# Patient Record
Sex: FEMALE | Race: WHITE | HISPANIC OR LATINO | Employment: PART TIME | URBAN - METROPOLITAN AREA
[De-identification: names, ages, dates, MRNs, and addresses within clinical notes are randomized per-mention and may not be internally consistent; named-entity substitution may affect disease eponyms.]

---

## 2017-08-21 ENCOUNTER — OFFICE VISIT (OUTPATIENT)
Dept: MEDICAL GROUP | Facility: CLINIC | Age: 37
End: 2017-08-21
Payer: COMMERCIAL

## 2017-08-21 VITALS
SYSTOLIC BLOOD PRESSURE: 108 MMHG | OXYGEN SATURATION: 99 % | TEMPERATURE: 97.3 F | RESPIRATION RATE: 14 BRPM | DIASTOLIC BLOOD PRESSURE: 64 MMHG | HEART RATE: 64 BPM | BODY MASS INDEX: 26.87 KG/M2 | WEIGHT: 146 LBS | HEIGHT: 62 IN

## 2017-08-21 DIAGNOSIS — Z00.00 ROUTINE GENERAL MEDICAL EXAMINATION AT A HEALTH CARE FACILITY: ICD-10-CM

## 2017-08-21 DIAGNOSIS — Z00.00 LABORATORY EXAMINATION ORDERED AS PART OF A ROUTINE GENERAL MEDICAL EXAMINATION: ICD-10-CM

## 2017-08-21 DIAGNOSIS — Z23 NEED FOR TDAP VACCINATION: ICD-10-CM

## 2017-08-21 PROCEDURE — 90471 IMMUNIZATION ADMIN: CPT | Performed by: FAMILY MEDICINE

## 2017-08-21 PROCEDURE — 90715 TDAP VACCINE 7 YRS/> IM: CPT | Performed by: FAMILY MEDICINE

## 2017-08-21 PROCEDURE — 99395 PREV VISIT EST AGE 18-39: CPT | Mod: 25 | Performed by: FAMILY MEDICINE

## 2017-08-21 ASSESSMENT — ENCOUNTER SYMPTOMS
SEIZURES: 0
ORTHOPNEA: 0
SENSORY CHANGE: 0
NERVOUS/ANXIOUS: 0
TINGLING: 0
HEARTBURN: 0
MYALGIAS: 0
NECK PAIN: 0
SHORTNESS OF BREATH: 0
DIARRHEA: 0
ABDOMINAL PAIN: 0
WEIGHT LOSS: 0
BLURRED VISION: 0
CHILLS: 0
PALPITATIONS: 0
VOMITING: 0
NAUSEA: 0
LOSS OF CONSCIOUSNESS: 0
FOCAL WEAKNESS: 0
BACK PAIN: 0
DOUBLE VISION: 0
COUGH: 0
HEADACHES: 1
DEPRESSION: 0
DIZZINESS: 0
BRUISES/BLEEDS EASILY: 0
FEVER: 0
SORE THROAT: 0
SPUTUM PRODUCTION: 0
BLOOD IN STOOL: 0
TREMORS: 0
SPEECH CHANGE: 0

## 2017-08-21 ASSESSMENT — PATIENT HEALTH QUESTIONNAIRE - PHQ9: CLINICAL INTERPRETATION OF PHQ2 SCORE: 0

## 2017-08-21 ASSESSMENT — LIFESTYLE VARIABLES: SUBSTANCE_ABUSE: 0

## 2017-08-21 NOTE — MR AVS SNAPSHOT
"        Estela Bryantr   2017 3:20 PM   Office Visit   MRN: [id number]    Department:  [facility]   Dept Phone:  [phone]    Description:  Female : 1980   Provider:  Susan Canas M.D.           Reason for Visit     Annual Exam           Allergies as of 2017     Allergen Noted Reactions    Zomig [Zolmitriptan] 2012   Shortness of Breath      You were diagnosed with     Routine general medical examination at a health care facility   [V70.0.ICD-9-CM]       Laboratory examination ordered as part of a routine general medical examination   [V72.62.ICD-9-CM]       Need for Tdap vaccination   [[id number]]         Vital Signs     Blood Pressure Pulse Temperature Respirations Height Weight    108/64 mmHg 64 36.3 °C (97.3 °F) 14 1.575 m (5' 2\") 66.225 kg (146 lb)    Body Mass Index Oxygen Saturation Last Menstrual Period Smoking Status          26.70 kg/m2 99% 2010 Never Smoker         Basic Information     Date Of Birth Sex Race Ethnicity Preferred Language    1980 Female  or   Origin (Hungarian,Slovenian,Palauan,Parth, etc) English      Problem List              ICD-10-CM Priority Class Noted - Resolved    CVA (cerebrovascular accident due to intracerebral hemorrhage) (CMS-HCC) I61.9   2009 - Present    Chronic post-traumatic headache G44.329   Unknown - Present    Large cavernous hemangioma D18.09   Unknown - Present      Health Maintenance        Date Due Completion Dates    IMM DTaP/Tdap/Td Vaccine (1 - Tdap) 1998 ---    IMM INFLUENZA (1) 2017 10/25/2009            Current Immunizations     Influenza TIV (IM) 10/25/2009  2:45 PM    Tdap Vaccine  Incomplete      Below and/or attached are the medications your provider expects you to take. Review all of your home medications and newly ordered medications with your provider and/or pharmacist. Follow medication instructions as directed by your provider and/or pharmacist. Please keep your " medication list with you and share with your provider. Update the information when medications are discontinued, doses are changed, or new medications (including over-the-counter products) are added; and carry medication information at all times in the event of emergency situations     Allergies:  ZOMIG - Shortness of Breath               Medications  Valid as of: August 21, 2017 -  4:10 PM    Generic Name Brand Name Tablet Size Instructions for use    .                 Medicines prescribed today were sent to:     [email or web address] [address] [place] [address] [place], [place] - [address] [facility] [employer]    [address] [place] [address]    Phone: [phone] Fax: [phone]    Open 24 Hours?: No      Medication refill instructions:       If your prescription bottle indicates you have medication refills left, it is not necessary to call your provider’s office. Please contact your pharmacy and they will refill your medication.    If your prescription bottle indicates you do not have any refills left, you may request refills at any time through one of the following ways: The online [employer] system (except Urgent Care), by calling your provider’s office, or by asking your pharmacy to contact your provider’s office with a refill request. Medication refills are processed only during regular business hours and may not be available until the next business day. Your provider may request additional information or to have a follow-up visit with you prior to refilling your medication.   *Please Note: Medication refills are assigned a new Rx number when refilled electronically. Your pharmacy may indicate that no refills were authorized even though a new prescription for the same medication is available at the pharmacy. Please request the medicine by name with the pharmacy before contacting your provider for a refill.        Your To Do List     Future Labs/Procedures Complete By Expires    CBC WITHOUT DIFFERENTIAL   As directed 2/21/2018    COMP METABOLIC PANEL  As directed 8/22/2018    LIPID PROFILE  As directed 8/22/2018         MyChart Status: Patient Declined

## 2017-08-21 NOTE — PROGRESS NOTES
"Subjective:      Estela Pena is a 37 y.o. female who presents with Gynecologic Exam            Gynecologic Exam        ROS       Objective:     /64 mmHg  Pulse 64  Temp(Src) 36.3 °C (97.3 °F)  Resp 14  Ht 1.575 m (5' 2\")  Wt 66.225 kg (146 lb)  BMI 26.70 kg/m2  SpO2 99%  LMP 07/07/2010     Physical Exam            Assessment/Plan:     1. Routine general medical examination at a health care facility  ***    2. Laboratory examination ordered as part of a routine general medical examination  ***  - COMP METABOLIC PANEL; Future  - LIPID PROFILE; Future  - CBC WITHOUT DIFFERENTIAL; Future    3. Need for Tdap vaccination  ***  - TDAP VACCINE =>8YO IM        "

## 2017-12-15 ENCOUNTER — OFFICE VISIT (OUTPATIENT)
Dept: URGENT CARE | Facility: PHYSICIAN GROUP | Age: 37
End: 2017-12-15
Payer: COMMERCIAL

## 2017-12-15 ENCOUNTER — HOSPITAL ENCOUNTER (OUTPATIENT)
Dept: RADIOLOGY | Facility: MEDICAL CENTER | Age: 37
End: 2017-12-15
Attending: EMERGENCY MEDICINE
Payer: COMMERCIAL

## 2017-12-15 VITALS
BODY MASS INDEX: 28.16 KG/M2 | RESPIRATION RATE: 12 BRPM | SYSTOLIC BLOOD PRESSURE: 110 MMHG | TEMPERATURE: 97.4 F | DIASTOLIC BLOOD PRESSURE: 72 MMHG | HEIGHT: 62 IN | OXYGEN SATURATION: 98 % | HEART RATE: 60 BPM | WEIGHT: 153 LBS

## 2017-12-15 DIAGNOSIS — J04.0 LARYNGITIS: ICD-10-CM

## 2017-12-15 LAB
INT CON NEG: NEGATIVE
INT CON POS: POSITIVE
S PYO AG THROAT QL: NEGATIVE

## 2017-12-15 PROCEDURE — 70360 X-RAY EXAM OF NECK: CPT

## 2017-12-15 PROCEDURE — 87880 STREP A ASSAY W/OPTIC: CPT | Performed by: EMERGENCY MEDICINE

## 2017-12-15 PROCEDURE — 99203 OFFICE O/P NEW LOW 30 MIN: CPT | Performed by: EMERGENCY MEDICINE

## 2017-12-15 RX ORDER — METHYLPREDNISOLONE 4 MG/1
TABLET ORAL
Qty: 1 KIT | Refills: 0 | Status: SHIPPED | OUTPATIENT
Start: 2017-12-15 | End: 2020-05-20

## 2017-12-15 RX ORDER — CODEINE PHOSPHATE AND GUAIFENESIN 10; 100 MG/5ML; MG/5ML
5 SOLUTION ORAL EVERY 6 HOURS PRN
Qty: 200 ML | Refills: 0 | Status: SHIPPED | OUTPATIENT
Start: 2017-12-15 | End: 2017-12-25

## 2017-12-15 NOTE — PROGRESS NOTES
"Subjective:      Estela Pena is a 37 y.o. female who presents with Laryngitis (with sore thorat x 2 days )            HPI  Pt with laryngitis/ sore throat  for the past three days with almost total loss of there voice.  Pt has no stridor, did not get a flu shot. Pt denies fever , chills, nausea or vomiting,    Allergies   Allergen Reactions   • Zomig [Zolmitriptan] Shortness of Breath     Social History     Social History   • Marital status:      Spouse name: N/A   • Number of children: N/A   • Years of education: N/A     Occupational History   • Not on file.     Social History Main Topics   • Smoking status: Never Smoker   • Smokeless tobacco: Never Used   • Alcohol use No   • Drug use: No   • Sexual activity: Yes     Birth control/ protection: Surgical     Other Topics Concern   • Not on file     Social History Narrative   • No narrative on file     No current outpatient prescriptions on file prior to visit.     No current facility-administered medications on file prior to visit.    . family history includes Heart Disease in her mother; Hypertension in her mother; Other in her father.  Review of Systems   Constitutional: Negative for chills and fever.   HENT: Positive for congestion and sore throat.    Eyes: Negative for discharge and redness.   Respiratory: Positive for cough. Negative for sputum production and shortness of breath.    Cardiovascular: Negative for chest pain and palpitations.   Gastrointestinal: Negative for abdominal pain, nausea and vomiting.   Genitourinary: Negative.    Musculoskeletal: Negative for back pain and neck pain.   Skin: Negative for rash.   Neurological: Positive for speech change. Negative for tingling.          Objective:     /72   Pulse 60   Temp 36.3 °C (97.4 °F)   Resp 12   Ht 1.575 m (5' 2\")   Wt 69.4 kg (153 lb)   LMP 07/07/2010   SpO2 98%   BMI 27.98 kg/m²      Physical Exam   Constitutional: She appears well-developed and well-nourished. No " distress.   HENT:   Head: Normocephalic.   Right Ear: External ear normal.   Left Ear: External ear normal.   Mouth/Throat: No oropharyngeal exudate.   Severe laryngitis , pharynx inflamed.   Eyes: Conjunctivae are normal. Right eye exhibits no discharge. Left eye exhibits no discharge.   Neck: No tracheal deviation present.   Cardiovascular: Normal rate, regular rhythm and normal heart sounds.    Pulmonary/Chest: Effort normal and breath sounds normal. No stridor. No respiratory distress. She has no wheezes.   Abdominal: She exhibits no distension. There is no tenderness.   Musculoskeletal: Normal range of motion. She exhibits no edema, tenderness or deformity.   Lymphadenopathy:     She has no cervical adenopathy.   Neurological: She is alert. Coordination normal.   Skin: Skin is warm and dry. She is not diaphoretic. No erythema.   Psychiatric: She has a normal mood and affect. Her behavior is normal.              70    Soft tissue neck no retropharyngeal or epiglottal swelling.doris.    poct strep negative.  Assessment/Plan:     DX Acute Laryngitis    I will get a soft tissue of the neck to r/o retropharyngeal abscess.  NEGATIVE    I am recommending the patient initiate/ continue hydration efforts including the use of a vaporizer/humidifier/ netti pot. I also recommend the pt, initiate Mucinex (if older than 4) Sudafed or Dayquil if not hypertensive. In addition the patient will initiate the prescribed prescription medication/s: medrol dose mahendra. Cheratussin for cough. If the patient's condition exacerbates with worsening dysphagia, shortness of breath, uncontrolled fever, headache or chest pressure he/she will return immediately to the urgent care or go to  the emergency department for further evaluation. Pt given a work note for two days off.    Rebel Ruiz

## 2017-12-15 NOTE — LETTER
Diagnostic Sleep Study: Sleep Study Interpretation       Patient: female          Age: 37 y.o.                Sleep Study Data      Clinical Comments:      ***    INTERPRETATION:     ***    RESPIRATORY:     ***    OXIMETRY:     ***    CARDIAC:    ***    LIMB MOVEMENTS:     ***      Sleep Study Interpretation    ***

## 2017-12-15 NOTE — LETTER
December 15, 2017        Estela Pena  [address] [facility] [address]        Dear Estela:    Please ask for the next two days off from work for medical reasons.    If you have any questions or concerns, please don't hesitate to call.        Sincerely,        Rebel Ruiz M.D.    Electronically Signed

## 2017-12-16 ASSESSMENT — ENCOUNTER SYMPTOMS
SORE THROAT: 1
VOMITING: 0
SHORTNESS OF BREATH: 0
SPEECH CHANGE: 1
COUGH: 1
BACK PAIN: 0
NAUSEA: 0
FEVER: 0
EYE DISCHARGE: 0
TINGLING: 0
SPUTUM PRODUCTION: 0
PALPITATIONS: 0
ABDOMINAL PAIN: 0
CHILLS: 0
EYE REDNESS: 0
NECK PAIN: 0

## 2020-05-20 ENCOUNTER — TELEPHONE (OUTPATIENT)
Dept: MEDICAL GROUP | Facility: MEDICAL CENTER | Age: 40
End: 2020-05-20

## 2020-05-20 ENCOUNTER — TELEMEDICINE (OUTPATIENT)
Dept: MEDICAL GROUP | Facility: MEDICAL CENTER | Age: 40
End: 2020-05-20
Payer: COMMERCIAL

## 2020-05-20 VITALS — HEIGHT: 63 IN | WEIGHT: 150 LBS | BODY MASS INDEX: 26.58 KG/M2

## 2020-05-20 DIAGNOSIS — G47.00 MIXED INSOMNIA: ICD-10-CM

## 2020-05-20 DIAGNOSIS — J02.8 ACUTE BACTERIAL PHARYNGITIS: ICD-10-CM

## 2020-05-20 DIAGNOSIS — B96.89 ACUTE BACTERIAL PHARYNGITIS: ICD-10-CM

## 2020-05-20 DIAGNOSIS — G44.329 CHRONIC POST-TRAUMATIC HEADACHE, NOT INTRACTABLE: ICD-10-CM

## 2020-05-20 PROCEDURE — 99213 OFFICE O/P EST LOW 20 MIN: CPT | Mod: 95,CR | Performed by: FAMILY MEDICINE

## 2020-05-20 RX ORDER — HYDROCODONE BITARTRATE AND ACETAMINOPHEN 7.5; 325 MG/1; MG/1
1 TABLET ORAL EVERY 4 HOURS PRN
Qty: 40 TAB | Refills: 0 | Status: SHIPPED | OUTPATIENT
Start: 2020-05-20 | End: 2020-11-05 | Stop reason: SDUPTHER

## 2020-05-20 RX ORDER — AMOXICILLIN 875 MG/1
875 TABLET, COATED ORAL 2 TIMES DAILY
Qty: 14 TAB | Refills: 0 | Status: SHIPPED | OUTPATIENT
Start: 2020-05-20 | End: 2020-05-27

## 2020-05-20 NOTE — PROGRESS NOTES
This encounter was conducted via Zoom meeting  Verbal consent was obtained. Patient's identity was verified.       CC:Sore throat, headache, insomnia                                                                                                                                      HPI:   Estela presents today with the following.    1. Chronic post-traumatic headache, not intractable  Patient continues to have episodic headache.  This responds well to hydrocodone taken as needed.  Her last quantity of hydrocodone lasted over 2 years.   review shows no controlled substances prescribed in the last 2 years.  Patient has been careful with the medication.  Discussed with the patient that I cannot prescribe to her in the quantity that I did in the past.  She states she is fine with that.  Opiate risk score is 0.  Discussed risks and benefits of the medication.  Patient is very familiar with the medication having taken it episodically for years.  Patient does not drink any alcohol.  No aberrant use or irresponsible behavior has been observed or reported.  I feel the medication is appropriate for this patient.    2. Acute bacterial pharyngitis  Patient has had 3 days of very sore throat accompanied by phlegm that is yellow and green.  Denies any blood.  Denies any shortness of breath.  She is having some intermittent productive cough.  Denies fever or chills.  Denies GI changes.  Patient has had pharyngitis episodically over the years, usually streptococcal.  Amoxicillin is prescribed.  She will let me know if this does not work well for her.  If her condition worsens she will call the [facility] department right away.    3. Mixed insomnia  Patient has had insomnia she states for greater than 6 months.  She has trouble falling asleep and staying asleep.  She has tried NyQuil.  She has tried herbal teas for sleep.  Discussed trying low-dose Tylenol PM.  She will let me know how that works for her.      Patient  "Active Problem List    Diagnosis Date Noted   • Large cavernous hemangioma    • Chronic post-traumatic headache    • CVA (cerebrovascular accident due to intracerebral hemorrhage) ([facility]) 07/17/2009       Current Outpatient Medications   Medication Sig Dispense Refill   • HYDROcodone-acetaminophen (NORCO) 7.5-325 MG per tablet Take 1 Tab by mouth every four hours as needed for up to 7 days. 40 Tab 0   • amoxicillin (AMOXIL) 875 MG tablet Take 1 Tab by mouth 2 times a day for 7 days. 14 Tab 0     No current facility-administered medications for this visit.          Allergies as of 05/20/2020 - Reviewed 05/20/2020   Allergen Reaction Noted   • Zomig [zolmitriptan] Shortness of Breath 07/06/2012        ROS:  Denies, chest pain, Shortness of breath, Edema.     Ht 1.6 m (5' 3\")   Wt 68 kg (150 lb)   LMP 07/07/2010   BMI 26.57 kg/m²       Physical Exam:  Constitutional: Alert, no distress, well-groomed.  Skin: No rashes in visible areas.  Eye: Round. Conjunctiva clear, No icterus.   ENMT: Lips pink without lesions, good dentition, moist mucous membranes. Phonation normal.  It is difficult to see on the video but I do believe she has erythema of the back of the throat.  Neck: No masses, no thyromegaly. Moves freely without pain.  CV: Pulse as reported by patient  Respiratory: Unlabored respiratory effort, no cough or audible wheeze  Psych: Alert and oriented x3, normal affect and mood.          Assessment and Plan.   40 y.o. female with the following issues.    1. Chronic post-traumatic headache, not intractable  The medication has been helpful in the past and well-tolerated and is renewed.  - HYDROcodone-acetaminophen (NORCO) 7.5-325 MG per tablet; Take 1 Tab by mouth every four hours as needed for up to 7 days.  Dispense: 40 Tab; Refill: 0    2. Acute bacterial pharyngitis  Antibiotic prescription discussed and sent to pharmacy.  - amoxicillin (AMOXIL) 875 MG tablet; Take 1 Tab by mouth 2 times a day for 7 days.  " Dispense: 14 Tab; Refill: 0    3. Mixed insomnia  Trial of diphenhydramine 25 mg with or without Tylenol at nighttime for her mixed insomnia is discussed.  Patient will try this and let me know how she does.    Follow-up visit in office in 4 months.  Follow-up sooner as needed.

## 2020-05-20 NOTE — TELEPHONE ENCOUNTER
1. Caller Name: Estela                   Call Back Number: [phone]  [facility] PCP or Specialty Provider: Yes         2.  Does patient have any active symptoms of respiratory illness? Yes, the patient reports the following respiratory symptoms: cough, sore throat and headache. Denies fever or feeling short of breath.    3.  Does patient have any comoribidities? None     4.  Has the patient traveled in the last 14 days OR had any known contact with someone who is suspected or confirmed to have COVID-19?  No.    5. Disposition: Offered patient virtual visit to limit potential exposure to others; patient also given self care instructions/Nurse transferred pt to scheduling to set up virtual visit.    Note routed to [facility] Provider: MARTHAI only.

## 2020-05-26 DIAGNOSIS — R05.3 PERSISTENT COUGH: ICD-10-CM

## 2020-05-26 RX ORDER — BENZONATATE 100 MG/1
100 CAPSULE ORAL 3 TIMES DAILY PRN
Qty: 60 CAP | Refills: 0 | Status: SHIPPED | OUTPATIENT
Start: 2020-05-26 | End: 2020-06-18

## 2020-06-16 ENCOUNTER — TELEPHONE (OUTPATIENT)
Dept: HEALTH INFORMATION MANAGEMENT | Facility: OTHER | Age: 40
End: 2020-06-16

## 2020-06-18 ENCOUNTER — TELEMEDICINE (OUTPATIENT)
Dept: MEDICAL GROUP | Facility: MEDICAL CENTER | Age: 40
End: 2020-06-18
Payer: COMMERCIAL

## 2020-06-18 VITALS — BODY MASS INDEX: 27.29 KG/M2 | RESPIRATION RATE: 12 BRPM | WEIGHT: 154 LBS | HEIGHT: 63 IN

## 2020-06-18 DIAGNOSIS — H93.8X3 PLUGGED FEELING IN EAR, BILATERAL: ICD-10-CM

## 2020-06-18 DIAGNOSIS — J30.1 SEASONAL ALLERGIC RHINITIS DUE TO POLLEN: ICD-10-CM

## 2020-06-18 PROCEDURE — 99213 OFFICE O/P EST LOW 20 MIN: CPT | Mod: 95,CR | Performed by: FAMILY MEDICINE

## 2020-06-18 RX ORDER — FLUTICASONE PROPIONATE 50 MCG
2 SPRAY, SUSPENSION (ML) NASAL 2 TIMES DAILY
Qty: 1 BOTTLE | Refills: 6 | Status: SHIPPED | OUTPATIENT
Start: 2020-06-18 | End: 2021-09-08

## 2020-06-18 RX ORDER — GUAIFENESIN 600 MG/1
600 TABLET, EXTENDED RELEASE ORAL EVERY 12 HOURS
Qty: 28 TAB | Refills: 2 | COMMUNITY
Start: 2020-06-18 | End: 2021-09-08

## 2020-06-18 RX ORDER — LORATADINE 10 MG/1
CAPSULE, LIQUID FILLED ORAL
COMMUNITY
End: 2020-11-05

## 2020-06-18 NOTE — PROGRESS NOTES
This encounter was conducted via Zoom meeting  Verbal consent was obtained. Patient's identity was verified.       CC: Nasal congestion, ear fullness                                                                                                                                    HPI:   Estela presents today with the following.    1. Seasonal allergic rhinitis due to pollen  Her sore throat has resolved.  However, she has persistent nasal congestion.  She is using Claritin-D.  The stronger antihistamines make her sleepy.  Using saline rinse, claritin D, mucinex and ear lavage.  Ear lavage is not helping.  Still very congested.  Have asked her to add flonase.  Order sent to pharmacy.  Have agreed that if she is not seeing improvement in a week or so I will make an ENT referral.  She is having an occasional nonproductive cough.      2. Plugged feeling in ear, bilateral  The ear lavage did not produce much of anything.  Denies any bleeding or discharge from the ears.  Have asked her to discontinue the ear lavage as this is likely eustachian tube dysfunction and she will probably get more relief using the nasal steroid.        Patient Active Problem List    Diagnosis Date Noted   • Seasonal allergic rhinitis due to pollen 06/18/2020   • Large cavernous hemangioma    • Chronic post-traumatic headache    • CVA (cerebrovascular accident due to intracerebral hemorrhage) ([facility]) 07/17/2009       Current Outpatient Medications   Medication Sig Dispense Refill   • Pseudoephedrine HCl (SUDAFED 12 HOUR PO) Take  by mouth.     • Loratadine (CLARITIN) 10 MG Cap Take  by mouth.     • fluticasone (FLONASE) 50 MCG/ACT nasal spray Spray 2 Sprays in nose 2 times a day. 1 Bottle 6   • guaiFENesin ER (MUCINEX) 600 MG TABLET SR 12 HR Take 1 Tab by mouth every 12 hours. 28 Tab 2     No current facility-administered medications for this visit.          Allergies as of 06/18/2020 - Reviewed 06/18/2020   Allergen Reaction Noted   • Zomig  "[zolmitriptan] Shortness of Breath 07/06/2012        ROS:  Denies, chest pain, Shortness of breath, Edema.     Resp 12 Comment: Observed  Ht 1.6 m (5' 3\")   Wt 69.9 kg (154 lb)   LMP 07/07/2010   BMI 27.28 kg/m²   Unable to obtain BP and pulse.      Physical Exam:  Constitutional: Alert, no distress, well-groomed.  Skin: No rashes in visible areas.  Eye: Round. Conjunctiva clear, lNo icterus.   ENMT: Lips pink without lesions, good dentition, moist mucous membranes. Phonation normal.  Neck: No masses, no thyromegaly. Moves freely without pain.  Respiratory: Unlabored respiratory effort, no cough or audible wheeze  Psych: Alert and oriented x3, normal affect and mood.          Assessment and Plan.   40 y.o. female with the following issues.    1. Seasonal allergic rhinitis due to pollen  Medications discussed, Flonase added to regimen  - fluticasone (FLONASE) 50 MCG/ACT nasal spray; Spray 2 Sprays in nose 2 times a day.  Dispense: 1 Bottle; Refill: 6  - guaiFENesin ER (MUCINEX) 600 MG TABLET SR 12 HR; Take 1 Tab by mouth every 12 hours.  Dispense: 28 Tab; Refill: 2    2. Plugged feeling in ear, bilateral  Medication discussed.  Flonase added to her regimen.  Have requested that she discontinue the ear lavage.  - guaiFENesin ER (MUCINEX) 600 MG TABLET SR 12 HR; Take 1 Tab by mouth every 12 hours.  Dispense: 28 Tab; Refill: 2    Follow-up in 2 months for general medical exam, sooner if needed.  "

## 2020-11-05 ENCOUNTER — OFFICE VISIT (OUTPATIENT)
Dept: MEDICAL GROUP | Facility: MEDICAL CENTER | Age: 40
End: 2020-11-05
Payer: COMMERCIAL

## 2020-11-05 ENCOUNTER — HOSPITAL ENCOUNTER (OUTPATIENT)
Dept: LAB | Facility: MEDICAL CENTER | Age: 40
End: 2020-11-05
Attending: FAMILY MEDICINE
Payer: COMMERCIAL

## 2020-11-05 VITALS
BODY MASS INDEX: 26.75 KG/M2 | HEIGHT: 63 IN | TEMPERATURE: 98.9 F | HEART RATE: 54 BPM | RESPIRATION RATE: 16 BRPM | WEIGHT: 151 LBS | DIASTOLIC BLOOD PRESSURE: 74 MMHG | SYSTOLIC BLOOD PRESSURE: 108 MMHG | OXYGEN SATURATION: 98 %

## 2020-11-05 DIAGNOSIS — Z00.00 LABORATORY EXAMINATION ORDERED AS PART OF A ROUTINE GENERAL MEDICAL EXAMINATION: ICD-10-CM

## 2020-11-05 DIAGNOSIS — G44.329 CHRONIC POST-TRAUMATIC HEADACHE, NOT INTRACTABLE: ICD-10-CM

## 2020-11-05 DIAGNOSIS — Z12.39 SCREENING BREAST EXAMINATION: ICD-10-CM

## 2020-11-05 DIAGNOSIS — Z23 NEED FOR IMMUNIZATION AGAINST INFLUENZA: ICD-10-CM

## 2020-11-05 DIAGNOSIS — Z00.00 ROUTINE GENERAL MEDICAL EXAMINATION AT A HEALTH CARE FACILITY: ICD-10-CM

## 2020-11-05 LAB
ALBUMIN SERPL BCP-MCNC: 4.5 G/DL (ref 3.2–4.9)
ALBUMIN/GLOB SERPL: 1.8 G/DL
ALP SERPL-CCNC: 48 U/L (ref 30–99)
ALT SERPL-CCNC: 21 U/L (ref 2–50)
ANION GAP SERPL CALC-SCNC: 10 MMOL/L (ref 7–16)
AST SERPL-CCNC: 21 U/L (ref 12–45)
BILIRUB SERPL-MCNC: 0.4 MG/DL (ref 0.1–1.5)
BUN SERPL-MCNC: 13 MG/DL (ref 8–22)
CALCIUM SERPL-MCNC: 9.5 MG/DL (ref 8.5–10.5)
CHLORIDE SERPL-SCNC: 102 MMOL/L (ref 96–112)
CHOLEST SERPL-MCNC: 259 MG/DL (ref 100–199)
CO2 SERPL-SCNC: 27 MMOL/L (ref 20–33)
CREAT SERPL-MCNC: 0.66 MG/DL (ref 0.5–1.4)
ERYTHROCYTE [DISTWIDTH] IN BLOOD BY AUTOMATED COUNT: 41.6 FL (ref 35.9–50)
EST. AVERAGE GLUCOSE BLD GHB EST-MCNC: 126 MG/DL
FASTING STATUS PATIENT QL REPORTED: NORMAL
GLOBULIN SER CALC-MCNC: 2.5 G/DL (ref 1.9–3.5)
GLUCOSE SERPL-MCNC: 91 MG/DL (ref 65–99)
HBA1C MFR BLD: 6 % (ref 0–5.6)
HCT VFR BLD AUTO: 40.2 % (ref 37–47)
HDLC SERPL-MCNC: 76 MG/DL
HGB BLD-MCNC: 13.8 G/DL (ref 12–16)
LDLC SERPL CALC-MCNC: 159 MG/DL
MCH RBC QN AUTO: 31.5 PG (ref 27–33)
MCHC RBC AUTO-ENTMCNC: 34.3 G/DL (ref 33.6–35)
MCV RBC AUTO: 91.8 FL (ref 81.4–97.8)
PLATELET # BLD AUTO: 305 K/UL (ref 164–446)
PMV BLD AUTO: 10.4 FL (ref 9–12.9)
POTASSIUM SERPL-SCNC: 4.4 MMOL/L (ref 3.6–5.5)
PROT SERPL-MCNC: 7 G/DL (ref 6–8.2)
RBC # BLD AUTO: 4.38 M/UL (ref 4.2–5.4)
SODIUM SERPL-SCNC: 139 MMOL/L (ref 135–145)
TRIGL SERPL-MCNC: 120 MG/DL (ref 0–149)
WBC # BLD AUTO: 5.2 K/UL (ref 4.8–10.8)

## 2020-11-05 PROCEDURE — 99396 PREV VISIT EST AGE 40-64: CPT | Mod: 25 | Performed by: FAMILY MEDICINE

## 2020-11-05 PROCEDURE — 36415 COLL VENOUS BLD VENIPUNCTURE: CPT

## 2020-11-05 PROCEDURE — 90471 IMMUNIZATION ADMIN: CPT | Performed by: FAMILY MEDICINE

## 2020-11-05 PROCEDURE — 85027 COMPLETE CBC AUTOMATED: CPT

## 2020-11-05 PROCEDURE — 90686 IIV4 VACC NO PRSV 0.5 ML IM: CPT | Performed by: FAMILY MEDICINE

## 2020-11-05 PROCEDURE — 80053 COMPREHEN METABOLIC PANEL: CPT

## 2020-11-05 PROCEDURE — 83036 HEMOGLOBIN GLYCOSYLATED A1C: CPT

## 2020-11-05 PROCEDURE — 80061 LIPID PANEL: CPT

## 2020-11-05 RX ORDER — FEXOFENADINE HCL 180 MG/1
180 TABLET ORAL DAILY
Qty: 30 TAB | Refills: 11 | COMMUNITY
Start: 2020-11-05 | End: 2021-09-08

## 2020-11-05 RX ORDER — HYDROCODONE BITARTRATE AND ACETAMINOPHEN 7.5; 325 MG/1; MG/1
1 TABLET ORAL EVERY 4 HOURS PRN
Qty: 40 TAB | Refills: 0 | Status: SHIPPED | OUTPATIENT
Start: 2020-11-05 | End: 2021-10-07 | Stop reason: SDUPTHER

## 2020-11-05 ASSESSMENT — ENCOUNTER SYMPTOMS
MEMORY LOSS: 0
NECK PAIN: 0
ORTHOPNEA: 0
DIZZINESS: 0
CHILLS: 0
TREMORS: 0
FOCAL WEAKNESS: 0
VOMITING: 0
BACK PAIN: 0
HEADACHES: 1
COUGH: 0
HEARTBURN: 0
HALLUCINATIONS: 0
SHORTNESS OF BREATH: 0
FEVER: 0
PALPITATIONS: 0
NAUSEA: 0
SEIZURES: 0
BLOOD IN STOOL: 0
BLURRED VISION: 0
NERVOUS/ANXIOUS: 0
DEPRESSION: 1
ABDOMINAL PAIN: 0
TINGLING: 0
DOUBLE VISION: 0
DIARRHEA: 0
LOSS OF CONSCIOUSNESS: 0
SENSORY CHANGE: 0
BRUISES/BLEEDS EASILY: 0
SPEECH CHANGE: 0
SPUTUM PRODUCTION: 0
WHEEZING: 0
WEAKNESS: 0
SORE THROAT: 0
INSOMNIA: 1
MYALGIAS: 0
WEIGHT LOSS: 0

## 2020-11-05 ASSESSMENT — LIFESTYLE VARIABLES: SUBSTANCE_ABUSE: 0

## 2020-11-05 ASSESSMENT — PATIENT HEALTH QUESTIONNAIRE - PHQ9: CLINICAL INTERPRETATION OF PHQ2 SCORE: 0

## 2020-11-05 NOTE — PROGRESS NOTES
Subjective:      Estela Pena is a 40 y.o. female who presents with Annual Exam        she is here for her annual examination.  Pap smear not needed. Had hysterectomy for benign reasons and a normal follow up pap    HPI     has a past medical history of Chronic post-traumatic headache and Large cavernous hemangioma.   has a past surgical history that includes other abdominal surgery (01/1991); other (1/1991); mass excision neuro (10/22/2009); cholecystectomy (7/2000); craniectomy (10/22/2009); and hysterectomy, total abdominal (2010).  family history includes Diabetes in her mother; Heart Disease in her mother; Hypertension in her mother; Other in her father.   reports that she has never smoked. She has never used smokeless tobacco. She reports that she does not drink alcohol or use drugs.      Current Outpatient Medications:   •  fexofenadine (ALLEGRA) 180 MG tablet, Take 1 Tab by mouth every day., Disp: 30 Tab, Rfl: 11  •  fluticasone (FLONASE) 50 MCG/ACT nasal spray, Spray 2 Sprays in nose 2 times a day., Disp: 1 Bottle, Rfl: 6  •  guaiFENesin ER (MUCINEX) 600 MG TABLET SR 12 HR, Take 1 Tab by mouth every 12 hours., Disp: 28 Tab, Rfl: 2  is allergic to zomig [zolmitriptan].    Review of Systems   Constitutional: Negative for chills, fever, malaise/fatigue and weight loss.   HENT: Positive for congestion. Negative for hearing loss, nosebleeds, sore throat and tinnitus.    Eyes: Negative for blurred vision and double vision.   Respiratory: Negative for cough, sputum production, shortness of breath and wheezing.    Cardiovascular: Negative for chest pain, palpitations, orthopnea and leg swelling.   Gastrointestinal: Negative for abdominal pain, blood in stool, diarrhea, heartburn, nausea and vomiting.   Genitourinary: Negative for dysuria, frequency, hematuria and urgency.   Musculoskeletal: Negative for back pain, joint pain, myalgias and neck pain.   Skin: Negative for rash.   Neurological: Positive for  "headaches. Negative for dizziness, tingling, tremors, sensory change, speech change, focal weakness, seizures, loss of consciousness and weakness.        Still a lot of post traumatic headaches   Endo/Heme/Allergies: Positive for environmental allergies. Does not bruise/bleed easily.   Psychiatric/Behavioral: Positive for depression. Negative for hallucinations, memory loss, substance abuse and suicidal ideas. The patient has insomnia. The patient is not nervous/anxious.         Sadness, a lot is going on.  Mother tried to suicide.  Mother breathing through a tracheostomy.          Objective:     /74   Pulse (!) 54   Temp 37.2 °C (98.9 °F)   Resp 16   Ht 1.6 m (5' 3\")   Wt 68.5 kg (151 lb)   LMP 07/07/2010   SpO2 98%   BMI 26.75 kg/m²      Physical Exam  Vitals signs reviewed.   Constitutional:       Appearance: She is well-developed.   HENT:      Head: Normocephalic and atraumatic.   Eyes:      General:         Left eye: No discharge.      Pupils: Pupils are equal, round, and reactive to light.   Neck:      Musculoskeletal: Normal range of motion and neck supple.      Thyroid: No thyromegaly.      Vascular: No JVD.   Cardiovascular:      Rate and Rhythm: Normal rate and regular rhythm.      Heart sounds: Normal heart sounds. No murmur.   Pulmonary:      Effort: Pulmonary effort is normal. No respiratory distress.      Breath sounds: Normal breath sounds. No wheezing or rales.   Abdominal:      General: Bowel sounds are normal. There is no distension.      Palpations: Abdomen is soft. There is no mass.      Tenderness: There is no abdominal tenderness.   Musculoskeletal: Normal range of motion.   Lymphadenopathy:      Cervical: No cervical adenopathy.   Skin:     General: Skin is warm and dry.      Findings: No erythema or rash.   Neurological:      Mental Status: She is alert and oriented to person, place, and time.      Coordination: Coordination normal.   Psychiatric:         Attention and " Perception: Attention normal.         Mood and Affect: Mood is anxious. Affect is tearful.         Speech: Speech normal.         Behavior: Behavior normal.         Thought Content: Thought content normal.         Cognition and Memory: Cognition normal.                 Assessment/Plan:        1. Routine general medical examination at a health care facility  She is generally well.      2. Laboratory examination ordered as part of a routine general medical examination  Routine orders discussed and placed  - Comp Metabolic Panel; Future  - Lipid Profile; Future  - CBC WITHOUT DIFFERENTIAL; Future  - HEMOGLOBIN A1C; Future    3. Screening breast examination  Order discussed and placed  - MA-SCREENING MAMMO BILAT W/TOMOSYNTHESIS W/CAD; Future    4. Need for immunization against influenza  Administered today  - Influenza Vaccine Quad Injection (PF)

## 2020-11-05 NOTE — PROGRESS NOTES
She continues to have post traumatic headaches.  She uses her hydrocodone sparingly, up to 1 1/2 per week.  She uses this only as needed.  Last filled 5/2020.  She has a few left.  Does need a renewal.  Cannot take NSAIDS due to brain surgery on cavernous hemangioma.  PDMP review is without inconsistencies.  Under more stress but handling it well.  Taking allergy medication.  Flu shot was given at her annual visit with me today.

## 2020-12-19 ENCOUNTER — HOSPITAL ENCOUNTER (OUTPATIENT)
Dept: RADIOLOGY | Facility: MEDICAL CENTER | Age: 40
End: 2020-12-19
Attending: FAMILY MEDICINE
Payer: COMMERCIAL

## 2020-12-19 DIAGNOSIS — Z12.39 SCREENING BREAST EXAMINATION: ICD-10-CM

## 2020-12-19 PROCEDURE — 77067 SCR MAMMO BI INCL CAD: CPT

## 2021-01-11 DIAGNOSIS — H93.8X3 PLUGGED FEELING IN EAR, BILATERAL: ICD-10-CM

## 2021-01-11 DIAGNOSIS — J30.1 SEASONAL ALLERGIC RHINITIS DUE TO POLLEN: ICD-10-CM

## 2021-01-11 RX ORDER — AZELASTINE 1 MG/ML
2 SPRAY, METERED NASAL 2 TIMES DAILY
Qty: 30 ML | Refills: 2 | Status: SHIPPED | OUTPATIENT
Start: 2021-01-11 | End: 2021-09-08

## 2021-04-30 ENCOUNTER — OFFICE VISIT (OUTPATIENT)
Dept: MEDICAL GROUP | Facility: MEDICAL CENTER | Age: 41
End: 2021-04-30
Payer: COMMERCIAL

## 2021-04-30 VITALS
TEMPERATURE: 97.7 F | HEART RATE: 74 BPM | SYSTOLIC BLOOD PRESSURE: 102 MMHG | DIASTOLIC BLOOD PRESSURE: 60 MMHG | BODY MASS INDEX: 28.53 KG/M2 | WEIGHT: 161 LBS | OXYGEN SATURATION: 97 % | HEIGHT: 63 IN

## 2021-04-30 DIAGNOSIS — R09.81 NASAL CONGESTION: ICD-10-CM

## 2021-04-30 DIAGNOSIS — J30.1 SEASONAL ALLERGIC RHINITIS DUE TO POLLEN: ICD-10-CM

## 2021-04-30 PROCEDURE — 99213 OFFICE O/P EST LOW 20 MIN: CPT | Performed by: FAMILY MEDICINE

## 2021-04-30 RX ORDER — PREDNISONE 10 MG/1
10 TABLET ORAL 2 TIMES DAILY WITH MEALS
Qty: 10 TABLET | Refills: 0 | Status: SHIPPED | OUTPATIENT
Start: 2021-04-30 | End: 2021-05-05

## 2021-04-30 ASSESSMENT — FIBROSIS 4 INDEX: FIB4 SCORE: 0.63

## 2021-04-30 ASSESSMENT — PATIENT HEALTH QUESTIONNAIRE - PHQ9: CLINICAL INTERPRETATION OF PHQ2 SCORE: 0

## 2021-04-30 NOTE — PROGRESS NOTES
Chief Complaint   Patient presents with   • Nasal Congestion       Subjective:     HPI:   Estela Pena presents today with the followin. Nasal congestion  This has been an intermittent problem, worse during the last year.  She will have a week where symptoms improve but it comes back.  Now both nasal passages are very clogged.  She tries to irrigate, uses nasal sprays and OTC allergy medications but little to no help.  Sometimes when she irrigates she tastes blood.  Streaking blood and mucus down the throat at time. She has considerable drainage.  She often loses her sense of smell.  CT sinuses discussed and order placed.    2. Seasonal allergic rhinitis due to pollen  Persistent and worsening rhinitis.  Advised allegra D instead of the claritin D but may not make much difference.          Patient Active Problem List    Diagnosis Date Noted   • Seasonal allergic rhinitis due to pollen 2020   • Large cavernous hemangioma    • Chronic post-traumatic headache    • CVA (cerebrovascular accident due to intracerebral hemorrhage) ([facility]) 2009       Current medicines (including changes today)  Current Outpatient Medications   Medication Sig Dispense Refill   • predniSONE (DELTASONE) 10 MG Tab Take 1 tablet by mouth 2 times a day with meals for 5 days. 10 tablet 0   • azelastine (ASTELIN) 137 MCG/SPRAY nasal spray Administer 2 Sprays into affected nostril(S) 2 times a day. 30 mL 2   • fexofenadine (ALLEGRA) 180 MG tablet Take 1 Tab by mouth every day. 30 Tab 11   • fluticasone (FLONASE) 50 MCG/ACT nasal spray Spray 2 Sprays in nose 2 times a day. 1 Bottle 6   • guaiFENesin ER (MUCINEX) 600 MG TABLET SR 12 HR Take 1 Tab by mouth every 12 hours. 28 Tab 2     No current facility-administered medications for this visit.       Allergies   Allergen Reactions   • Zomig [Zolmitriptan] Shortness of Breath       ROS: As per HPI       Objective:     /60   Pulse 74   Temp 36.5 °C (97.7 °F)   Ht 1.6 m (5'  "3\")   Wt 73 kg (161 lb)   SpO2 97%  Body mass index is 28.52 kg/m².    Physical Exam:  Constitutional: Well-developed and well-nourished. Not diaphoretic. No distress. Lucid and fluent.  Patient, spouse, physician and staff all wearing masks.  Skin: Skin is warm and dry. No rash noted.  Head: Atraumatic without lesions.  Eyes: Conjunctivae and extraocular motions are normal. Pupils are equal, round, and reactive to light. No scleral icterus.   Ears:  External ears unremarkable.   Nose: Nares pale mucosa, no visible blood or purulence, very clogged bilaterally. Mucosa with  Some edema, no erythema. No facial tenderness.  Mouth/Throat: Tongue normal. Oropharynx is clear and moist. Posterior pharynx with marked  Erythema and streaking, no exudates.  Mask off briefly for examination.   Neck: Supple, trachea midline. No thyromegaly present. No cervical or supraclavicular lymphadenopathy. No JVD or carotid bruits appreciated  Extremities: No cyanosis, clubbing, erythema, nor edema.   Neurological: Alert and oriented x 3.   Psychiatric:  Behavior, mood, and affect are appropriate.       Assessment and Plan:     41 y.o. female with the following issues:    1. Nasal congestion  CT-LOCALIZATION LIMITED SINUSES    predniSONE (DELTASONE) 10 MG Tab   2. Seasonal allergic rhinitis due to pollen  CT-LOCALIZATION LIMITED SINUSES    predniSONE (DELTASONE) 10 MG Tab         Followup: Return if symptoms worsen or fail to improve.  "

## 2021-05-11 ENCOUNTER — NURSE TRIAGE (OUTPATIENT)
Dept: MEDICAL GROUP | Facility: PHYSICIAN GROUP | Age: 41
End: 2021-05-11

## 2021-05-11 NOTE — TELEPHONE ENCOUNTER
Called and spoke to patient  regarding headaches. Request that I call the patient back at [phone] between 7:30 and 8:30 AM tomorrow (05/12/2021).     ----- Message from Ynes Tapia, Med Ass't sent at 5/10/2021  2:47 PM PDT -----  Estela Ruiz's , Angel called asking about her headaches and not sure if it's related to a sinus infection. Can you please call Angel back at [phone]      Please and thank you.    Ynes

## 2021-05-12 ENCOUNTER — HOSPITAL ENCOUNTER (OUTPATIENT)
Dept: RADIOLOGY | Facility: MEDICAL CENTER | Age: 41
End: 2021-05-12
Attending: FAMILY MEDICINE
Payer: COMMERCIAL

## 2021-05-12 ENCOUNTER — NURSE TRIAGE (OUTPATIENT)
Dept: MEDICAL GROUP | Facility: PHYSICIAN GROUP | Age: 41
End: 2021-05-12

## 2021-05-12 DIAGNOSIS — J30.1 SEASONAL ALLERGIC RHINITIS DUE TO POLLEN: ICD-10-CM

## 2021-05-12 DIAGNOSIS — R09.81 NASAL CONGESTION: ICD-10-CM

## 2021-05-12 PROCEDURE — 70486 CT MAXILLOFACIAL W/O DYE: CPT

## 2021-05-12 NOTE — TELEPHONE ENCOUNTER
"On Wednesday when her back was hurting she was really dizzy. That went away on Friday. She does not know if her headache is due to sinus pressure form nasal congestion. Pain is not isolated to her sinus area. Patient is headed to [facility] for CT of sinuses. Patient takes Norco for Chronic post-traumatic headache, not intractable, but it was not as helpful this last time around. Did advise patient that opioid pain medication can cause rebound headaches.    Patient states she does take tylenol when she has a mild headache and it helps, but when she has a severe headache \"nothing works\".     Patient is concerned because her Norco was not as helpful as it has been in the past for her headaches.     Patient would like to wait until she gets the results from the CT scan to make an appointment with Dr Canas to adjust treatment of her headaches.       Reason for Disposition  • Similar to previously diagnosed muscle-tension headaches  • Mild-moderate headache  • Headache is a chronic symptom (recurrent or ongoing AND lasting > 4 weeks)    Answer Assessment - Initial Assessment Questions  1. LOCATION: \"Where does it hurt?\"       Depends, sometimes on the left side of her head, but sometimes it is her whole head  2. ONSET: \"When did the headache start?\" (Minutes, hours or days)       Saturday-Monday  3. PATTERN: \"Does the pain come and go, or has it been constant since it started?\"      constant  4. SEVERITY: \"How bad is the pain?\" and \"What does it keep you from doing?\"  (e.g., Scale 1-10; mild, moderate, or severe)    - MILD (1-3): doesn't interfere with normal activities     - MODERATE (4-7): interferes with normal activities or awakens from sleep     - SEVERE (8-10): excruciating pain, unable to do any normal activities         8  5. RECURRENT SYMPTOM: \"Have you ever had headaches before?\" If so, ask: \"When was the last time?\" and \"What happened that time?\"       These are recurrent symptoms. Patient gets these headaches " "around once a month. This time was different because it started in her shoulders to her back then her chest and up to her head.   6. CAUSE: \"What do you think is causing the headache?\"      Unknown, she has had these since she had surgery in 2009. The removed blood clots from the back of her head  7. MIGRAINE: \"Have you been diagnosed with migraine headaches?\" If so, ask: \"Is this headache similar?\"       NO migraine diagnosis  8. HEAD INJURY: \"Has there been any recent injury to the head?\"       NO  9. OTHER SYMPTOMS: \"Do you have any other symptoms?\" (fever, stiff neck, eye pain, sore throat, cold symptoms)      No  10. PREGNANCY: \"Is there any chance you are pregnant?\" \"When was your last menstrual period?\"        No    Protocols used: HEADACHE-A-[place]    "

## 2021-05-12 NOTE — TELEPHONE ENCOUNTER
----- Message from Ynes Tapia, Med Ass't sent at 5/10/2021  2:47 PM PDT -----  Estela Ruiz's , Angel called asking about her headaches and not sure if it's related to a sinus infection. Can you please call Angel back at [phone]      Please and thank you.    Ynes

## 2021-09-08 ENCOUNTER — OFFICE VISIT (OUTPATIENT)
Dept: URGENT CARE | Facility: PHYSICIAN GROUP | Age: 41
End: 2021-09-08
Payer: COMMERCIAL

## 2021-09-08 VITALS
OXYGEN SATURATION: 98 % | DIASTOLIC BLOOD PRESSURE: 78 MMHG | RESPIRATION RATE: 16 BRPM | BODY MASS INDEX: 26.8 KG/M2 | HEART RATE: 69 BPM | WEIGHT: 157 LBS | SYSTOLIC BLOOD PRESSURE: 120 MMHG | HEIGHT: 64 IN | TEMPERATURE: 97.9 F

## 2021-09-08 DIAGNOSIS — K21.9 GASTROESOPHAGEAL REFLUX DISEASE, UNSPECIFIED WHETHER ESOPHAGITIS PRESENT: ICD-10-CM

## 2021-09-08 DIAGNOSIS — K29.00 ACUTE GASTRITIS, PRESENCE OF BLEEDING UNSPECIFIED, UNSPECIFIED GASTRITIS TYPE: ICD-10-CM

## 2021-09-08 PROCEDURE — 99214 OFFICE O/P EST MOD 30 MIN: CPT | Performed by: FAMILY MEDICINE

## 2021-09-08 RX ORDER — HYDROCODONE BITARTRATE AND ACETAMINOPHEN 5; 325 MG/1; MG/1
1 TABLET ORAL EVERY 4 HOURS PRN
COMMUNITY
End: 2021-10-07

## 2021-09-08 RX ORDER — OMEPRAZOLE 20 MG/1
20 CAPSULE, DELAYED RELEASE ORAL DAILY
Qty: 30 CAPSULE | Refills: 1 | Status: SHIPPED | OUTPATIENT
Start: 2021-09-08 | End: 2021-10-08

## 2021-09-08 RX ORDER — ALUMINA, MAGNESIA, AND SIMETHICONE 2400; 2400; 240 MG/30ML; MG/30ML; MG/30ML
10 SUSPENSION ORAL ONCE
Status: COMPLETED | OUTPATIENT
Start: 2021-09-08 | End: 2021-09-08

## 2021-09-08 RX ADMIN — ALUMINA, MAGNESIA, AND SIMETHICONE 10 ML: 2400; 2400; 240 SUSPENSION ORAL at 18:41

## 2021-09-08 ASSESSMENT — ENCOUNTER SYMPTOMS
DIZZINESS: 0
CHILLS: 0
EARLY SATIETY: 1
COUGH: 0
MYALGIAS: 0
VOMITING: 0
BELCHING: 1
FATIGUE: 0
FEVER: 0
NAUSEA: 1
ABDOMINAL PAIN: 1
SHORTNESS OF BREATH: 0
SORE THROAT: 0

## 2021-09-08 ASSESSMENT — FIBROSIS 4 INDEX: FIB4 SCORE: 0.63

## 2021-09-09 NOTE — PROGRESS NOTES
Subjective:   Estela Pena is a 41 y.o. female who presents for Abdominal Pain (Onset 1 week.)        Gastrophageal Reflux  She complains of abdominal pain (midepigastric with eating), belching, early satiety and nausea. She reports no coughing or no sore throat. This is a new problem. The current episode started in the past 7 days. The problem occurs occasionally. The problem has been unchanged. The symptoms are aggravated by certain foods. Pertinent negatives include no fatigue or melena. Risk factors: Denies alcohol use. She has tried an antacid for the symptoms. The treatment provided no relief.     PMH:  has a past medical history of Chronic post-traumatic headache and Large cavernous hemangioma.  MEDS:   Current Outpatient Medications:   •  HYDROcodone-acetaminophen (NORCO) 5-325 MG Tab per tablet, Take 1 Tablet by mouth every four hours as needed., Disp: , Rfl:   •  omeprazole (PRILOSEC) 20 MG delayed-release capsule, Take 1 Capsule by mouth every day for 30 days., Disp: 30 Capsule, Rfl: 1    Current Facility-Administered Medications:   •  mag hydrox-al hydrox-simeth (MAALOX PLUS ES or MYLANTA DS) suspension 10 mL, 10 mL, Oral, Once, Eligio Corona M.D.  ALLERGIES:   Allergies   Allergen Reactions   • Zomig [Zolmitriptan] Shortness of Breath     SURGHX:   Past Surgical History:   Procedure Laterality Date   • HYSTERECTOMY, TOTAL ABDOMINAL  2010    has one ovary   • MASS EXCISION NEURO  10/22/2009    Performed by VANIA DAO at [facility]   • CRANIECTOMY  10/22/2009    Performed by VANIA DAO at [facility]   • CHOLECYSTECTOMY  7/2000   • OTHER ABDOMINAL SURGERY  01/1991    stomach tumor   • OTHER  1/1991    stomach tumor     SOCHX:  reports that she has never smoked. She has never used smokeless tobacco. She reports that she does not drink alcohol and does not use drugs.  FH:   Family History   Problem Relation Age of Onset   • Hypertension Mother    • Heart Disease Mother   "  • Diabetes Mother    • Other Father      Review of Systems   Constitutional: Negative for chills, fatigue and fever.   HENT: Negative for sore throat.    Respiratory: Negative for cough and shortness of breath.    Gastrointestinal: Positive for abdominal pain (midepigastric with eating) and nausea. Negative for melena and vomiting.   Genitourinary: Negative for dysuria and frequency.   Musculoskeletal: Negative for myalgias.   Skin: Negative for rash.   Neurological: Negative for dizziness.        Objective:   /78 (BP Location: Left arm, Patient Position: Sitting, BP Cuff Size: Adult)   Pulse 69   Temp 36.6 °C (97.9 °F) (Temporal)   Resp 16   Ht 1.613 m (5' 3.5\")   Wt 71.2 kg (157 lb)   LMP 07/07/2010   SpO2 98%   BMI 27.38 kg/m²   Physical Exam  Vitals and nursing note reviewed.   Constitutional:       General: She is not in acute distress.     Appearance: She is well-developed.   HENT:      Head: Normocephalic and atraumatic.      Right Ear: External ear normal.      Left Ear: External ear normal.      Nose: Nose normal.      Mouth/Throat:      Mouth: Mucous membranes are moist.   Eyes:      Conjunctiva/sclera: Conjunctivae normal.   Cardiovascular:      Rate and Rhythm: Normal rate.   Pulmonary:      Effort: Pulmonary effort is normal. No respiratory distress.      Breath sounds: Normal breath sounds. No wheezing or rhonchi.   Abdominal:      General: Abdomen is flat. Bowel sounds are normal. There is no distension.      Palpations: Abdomen is soft.      Tenderness: There is no abdominal tenderness. There is no right CVA tenderness, left CVA tenderness or guarding.   Musculoskeletal:         General: Normal range of motion.   Skin:     General: Skin is warm and dry.   Neurological:      General: No focal deficit present.      Mental Status: She is alert and oriented to person, place, and time. Mental status is at baseline.      Gait: Gait (gait at baseline) normal.   Psychiatric:         Judgment: " Judgment normal.           Assessment/Plan:   1. Acute gastritis, presence of bleeding unspecified, unspecified gastritis type  - mag hydrox-al hydrox-simeth (MAALOX PLUS ES or MYLANTA DS) suspension 10 mL    2. Gastroesophageal reflux disease, unspecified whether esophagitis present  - omeprazole (PRILOSEC) 20 MG delayed-release capsule; Take 1 Capsule by mouth every day for 30 days.  Dispense: 30 Capsule; Refill: 1    Other orders  - HYDROcodone-acetaminophen (NORCO) 5-325 MG Tab per tablet; Take 1 Tablet by mouth every four hours as needed.        Medical Decision Making/Course:  In the course of preparing for this visit with review of the pertinent past medical history, recent and past clinic visits, current medications, and performing chart, immunization, medical history and medication reconciliation, and in the further course of obtaining the current history pertinent to the clinic visit today, performing an exam and evaluation, ordering and independently evaluating labs, tests, and/or procedures, prescribing any recommended new medications as noted above, providing any pertinent counseling and education and recommending further coordination of care, at least 30 minutes of total time were spent during this encounter.      Discussed close monitoring, return precautions, and supportive measures of maintaining adequate fluid hydration and caloric intake, relative rest and symptom management as needed for pain and/or fever.    Differential diagnosis, natural history, supportive care, and indications for immediate follow-up discussed.     Advised the patient to follow-up with the primary care physician for recheck, reevaluation, and consideration of further management.    Please note that this dictation was created using voice recognition software. I have worked with consultants from the vendor as well as technical experts from [employer] to optimize the interface. I have made every reasonable attempt to correct  obvious errors, but I expect that there are errors of grammar and possibly content that I did not discover before finalizing the note.

## 2021-09-09 NOTE — PATIENT INSTRUCTIONS
Gastritis, Adult    Gastritis is swelling (inflammation) of the stomach. Gastritis can develop quickly (acute). It can also develop slowly over time (chronic). It is important to get help for this condition. If you do not get help, your stomach can bleed, and you can get sores (ulcers) in your stomach.  What are the causes?  This condition may be caused by:  · Germs that get to your stomach.  · Drinking too much alcohol.  · Medicines you are taking.  · Too much acid in the stomach.  · A disease of the intestines or stomach.  · Stress.  · An allergic reaction.  · Crohn's disease.  · Some cancer treatments (radiation).  Sometimes the cause of this condition is not known.  What are the signs or symptoms?  Symptoms of this condition include:  · Pain in your stomach.  · A burning feeling in your stomach.  · Feeling sick to your stomach (nauseous).  · Throwing up (vomiting).  · Feeling too full after you eat.  · Weight loss.  · Bad breath.  · Throwing up blood.  · Blood in your poop (stool).  How is this diagnosed?  This condition may be diagnosed with:  · Your medical history and symptoms.  · A physical exam.  · Tests. These can include:  ? Blood tests.  ? Stool tests.  ? A procedure to look inside your stomach (upper endoscopy).  ? A test in which a sample of tissue is taken for testing (biopsy).  How is this treated?  Treatment for this condition depends on what caused it. You may be given:  · Antibiotic medicine, if your condition was caused by germs.  · H2 blockers and similar medicines, if your condition was caused by too much acid.  Follow these instructions at home:  Medicines  · Take over-the-counter and prescription medicines only as told by your doctor.  · If you were prescribed an antibiotic medicine, take it as told by your doctor. Do not stop taking it even if you start to feel better.  Eating and drinking    · Eat small meals often, instead of large meals.  · Avoid foods and drinks that make your symptoms  worse.  · Drink enough fluid to keep your pee (urine) pale yellow.  Alcohol use  · Do not drink alcohol if:  ? Your doctor tells you not to drink.  ? You are pregnant, may be pregnant, or are planning to become pregnant.  · If you drink alcohol:  ? Limit your use to:  § 0-1 drink a day for women.  § 0-2 drinks a day for men.  ? Be aware of how much alcohol is in your drink. In the [place] one drink equals one 12 oz bottle of beer (355 mL), one 5 oz glass of wine (148 mL), or one 1½ oz glass of hard liquor (44 mL).  General instructions  · Talk with your doctor about ways to manage stress. You can exercise or do deep breathing, meditation, or yoga.  · Do not smoke or use products that have nicotine or tobacco. If you need help quitting, ask your doctor.  · Keep all follow-up visits as told by your doctor. This is important.  Contact a doctor if:  · Your symptoms get worse.  · Your symptoms go away and then come back.  Get help right away if:  · You throw up blood or something that looks like coffee grounds.  · You have black or dark red poop.  · You throw up any time you try to drink fluids.  · Your stomach pain gets worse.  · You have a fever.  · You do not feel better after one week.  Summary  · Gastritis is swelling (inflammation) of the stomach.  · You must get help for this condition. If you do not get help, your stomach can bleed, and you can get sores (ulcers).  · This condition is diagnosed with medical history, physical exam, or tests.  · You can be treated with medicines for germs or medicines to block too much acid in your stomach.  This information is not intended to replace advice given to you by your health care provider. Make sure you discuss any questions you have with your health care provider.  Document Released: 06/05/2009 Document Revised: 05/07/2019 Document Reviewed: 05/07/2019  Elsevier Patient Education © 2020 [employer]  Gastroesophageal Reflux Disease, Adult  Gastroesophageal reflux (LUCILA)  happens when acid from the stomach flows up into the tube that connects the mouth and the stomach (esophagus). Normally, food travels down the esophagus and stays in the stomach to be digested. With LUCILA, food and stomach acid sometimes move back up into the esophagus. You may have a disease called gastroesophageal reflux disease (GERD) if the reflux:  · Happens often.  · Causes frequent or very bad symptoms.  · Causes problems such as damage to the esophagus.  When this happens, the esophagus becomes sore and swollen (inflamed). Over time, GERD can make small holes (ulcers) in the lining of the esophagus.  What are the causes?  This condition is caused by a problem with the muscle between the esophagus and the stomach. When this muscle is weak or not normal, it does not close properly to keep food and acid from coming back up from the stomach. The muscle can be weak because of:  · Tobacco use.  · Pregnancy.  · Having a certain type of hernia (hiatal hernia).  · Alcohol use.  · Certain foods and drinks, such as coffee, chocolate, onions, and peppermint.  What increases the risk?  You are more likely to develop this condition if you:  · Are overweight.  · Have a disease that affects your connective tissue.  · Use NSAID medicines.  What are the signs or symptoms?  Symptoms of this condition include:  · Heartburn.  · Difficult or painful swallowing.  · The feeling of having a lump in the throat.  · A bitter taste in the mouth.  · Bad breath.  · Having a lot of saliva.  · Having an upset or bloated stomach.  · Belching.  · Chest pain. Different conditions can cause chest pain. Make sure you see your doctor if you have chest pain.  · Shortness of breath or noisy breathing (wheezing).  · Ongoing (chronic) cough or a cough at night.  · Wearing away of the surface of teeth (tooth enamel).  · Weight loss.  How is this treated?  Treatment will depend on how bad your symptoms are. Your doctor may suggest:  · Changes to your  diet.  · Medicine.  · Surgery.  Follow these instructions at home:  Eating and drinking    · Follow a diet as told by your doctor. You may need to avoid foods and drinks such as:  ? Coffee and tea (with or without caffeine).  ? Drinks that contain alcohol.  ? Energy drinks and sports drinks.  ? Bubbly (carbonated) drinks or sodas.  ? Chocolate and cocoa.  ? Peppermint and mint flavorings.  ? Garlic and onions.  ? Horseradish.  ? Spicy and acidic foods. These include peppers, chili powder, lopez powder, vinegar, hot sauces, and BBQ sauce.  ? Citrus fruit juices and citrus fruits, such as oranges, luz marina, and limes.  ? Tomato-based foods. These include red sauce, chili, salsa, and pizza with red sauce.  ? Fried and fatty foods. These include donuts, french fries, potato chips, and high-fat dressings.  ? High-fat meats. These include hot dogs, rib eye steak, sausage, ham, and dolan.  ? High-fat dairy items, such as whole milk, butter, and cream cheese.  · Eat small meals often. Avoid eating large meals.  · Avoid drinking large amounts of liquid with your meals.  · Avoid eating meals during the 2-3 hours before bedtime.  · Avoid lying down right after you eat.  · Do not exercise right after you eat.  Lifestyle    · Do not use any products that contain nicotine or tobacco. These include cigarettes, e-cigarettes, and chewing tobacco. If you need help quitting, ask your doctor.  · Try to lower your stress. If you need help doing this, ask your doctor.  · If you are overweight, lose an amount of weight that is healthy for you. Ask your doctor about a safe weight loss goal.  General instructions  · Pay attention to any changes in your symptoms.  · Take over-the-counter and prescription medicines only as told by your doctor. Do not take aspirin, ibuprofen, or other NSAIDs unless your doctor says it is okay.  · Wear loose clothes. Do not wear anything tight around your waist.  · Raise (elevate) the head of your bed about 6  inches (15 cm).  · Avoid bending over if this makes your symptoms worse.  · Keep all follow-up visits as told by your doctor. This is important.  Contact a doctor if:  · You have new symptoms.  · You lose weight and you do not know why.  · You have trouble swallowing or it hurts to swallow.  · You have wheezing or a cough that keeps happening.  · Your symptoms do not get better with treatment.  · You have a hoarse voice.  Get help right away if:  · You have pain in your arms, neck, jaw, teeth, or back.  · You feel sweaty, dizzy, or light-headed.  · You have chest pain or shortness of breath.  · You throw up (vomit) and your throw-up looks like blood or coffee grounds.  · You pass out (faint).  · Your poop (stool) is bloody or black.  · You cannot swallow, drink, or eat.  Summary  · If a person has gastroesophageal reflux disease (GERD), food and stomach acid move back up into the esophagus and cause symptoms or problems such as damage to the esophagus.  · Treatment will depend on how bad your symptoms are.  · Follow a diet as told by your doctor.  · Take all medicines only as told by your doctor.  This information is not intended to replace advice given to you by your health care provider. Make sure you discuss any questions you have with your health care provider.  Document Released: 06/05/2009 Document Revised: 06/26/2019 Document Reviewed: 06/26/2019  [employer] Patient Education © 2020 [employer]

## 2021-10-07 ENCOUNTER — OFFICE VISIT (OUTPATIENT)
Dept: MEDICAL GROUP | Facility: MEDICAL CENTER | Age: 41
End: 2021-10-07
Payer: COMMERCIAL

## 2021-10-07 VITALS
SYSTOLIC BLOOD PRESSURE: 104 MMHG | HEIGHT: 64 IN | TEMPERATURE: 97.1 F | BODY MASS INDEX: 26.84 KG/M2 | OXYGEN SATURATION: 98 % | WEIGHT: 157.2 LBS | DIASTOLIC BLOOD PRESSURE: 70 MMHG | HEART RATE: 74 BPM

## 2021-10-07 DIAGNOSIS — K21.9 GASTROESOPHAGEAL REFLUX DISEASE, UNSPECIFIED WHETHER ESOPHAGITIS PRESENT: ICD-10-CM

## 2021-10-07 DIAGNOSIS — Z86.79 HISTORY OF INTRACRANIAL HEMORRHAGE: ICD-10-CM

## 2021-10-07 DIAGNOSIS — R73.02 GLUCOSE INTOLERANCE (IMPAIRED GLUCOSE TOLERANCE): ICD-10-CM

## 2021-10-07 DIAGNOSIS — G44.329 CHRONIC POST-TRAUMATIC HEADACHE, NOT INTRACTABLE: ICD-10-CM

## 2021-10-07 DIAGNOSIS — R51.9 INCREASED FREQUENCY OF HEADACHES: ICD-10-CM

## 2021-10-07 DIAGNOSIS — Z23 NEED FOR VACCINATION: ICD-10-CM

## 2021-10-07 DIAGNOSIS — R68.89 COLD INTOLERANCE: ICD-10-CM

## 2021-10-07 DIAGNOSIS — E78.5 DYSLIPIDEMIA: ICD-10-CM

## 2021-10-07 PROCEDURE — 90471 IMMUNIZATION ADMIN: CPT | Performed by: FAMILY MEDICINE

## 2021-10-07 PROCEDURE — 99214 OFFICE O/P EST MOD 30 MIN: CPT | Mod: 25 | Performed by: FAMILY MEDICINE

## 2021-10-07 PROCEDURE — 90686 IIV4 VACC NO PRSV 0.5 ML IM: CPT | Performed by: FAMILY MEDICINE

## 2021-10-07 RX ORDER — HYDROCODONE BITARTRATE AND ACETAMINOPHEN 7.5; 325 MG/1; MG/1
1 TABLET ORAL EVERY 4 HOURS PRN
Qty: 40 TABLET | Refills: 0 | Status: SHIPPED | OUTPATIENT
Start: 2021-10-07 | End: 2021-10-14

## 2021-10-07 ASSESSMENT — FIBROSIS 4 INDEX: FIB4 SCORE: 0.63

## 2021-10-07 NOTE — PROGRESS NOTES
Chief Complaint   Patient presents with   • Edema     Both feet, sometimes and hands   • Headache     Just started but getting them more often   • Cold Extremity       Subjective:     HPI:   Estela Pena presents today with the followin. Increased frequency of headaches/History of intracranial hemorrhage  Patient has history of cavernous hemangioma and intracranial bleed with CVA symptoms.  She has done very well but her post bleed headaches are greatly increasing.  She notes some occasional blurring of vision but does not appear to be steadily.  MRI order discussed and placed.    2. Need for vaccination  Quadrivalent preservative free flu vaccine administered today.    3. Chronic post-traumatic headache, not intractable  Patient is getting escalation in her chronic headache.  She does use hydrocodone for rescue intermittently.  Her last prescription was well over 2 years ago.  Patient is quite careful with the medication.  PDMP review shows no inconsistencies.  See above.  Consent for opiate treatment reviewed and signed.    4. Gastroesophageal reflux disease, unspecified whether esophagitis present  Patient was placed on omeprazole by urgent care.  This has been quite helpful in reducing her symptoms.  She will be getting the refill.  Lab order discussed and placed.    5. Cold intolerance  Follow-up lab order discussed and placed.  Her cold intolerance is worsening.  - TSH; Future    6. Glucose intolerance (impaired glucose tolerance)  Patient has history of glucose intolerance with her last glycohemoglobin being 6.0%.  Her last fasting glucose was normal.  Follow-up orders discussed and placed.  - HEMOGLOBIN A1C; Future  - Comp Metabolic Panel; Future    7. Dyslipidemia  Patient denies chest pain, chest pressure, palpitations or exertional shortness of breath. Patient is not on lipid-lowering medication.  Lipid elevation has been moderate.. Patient is a never smoker. Patient takes no aspirin daily.  "Patient has no history of myocardial infarction, stroke or PVD.  Lab orders discussed and placed.  The problem is clinically stable.  - Comp Metabolic Panel; Future  - Lipid Profile; Future      Patient Active Problem List    Diagnosis Date Noted   • History of intracranial hemorrhage 10/07/2021   • Increased frequency of headaches 10/07/2021   • Seasonal allergic rhinitis due to pollen 06/18/2020   • Large cavernous hemangioma    • Chronic post-traumatic headache    • CVA (cerebrovascular accident due to intracerebral hemorrhage) ([facility]) 07/17/2009       Current medicines (including changes today)  Current Outpatient Medications   Medication Sig Dispense Refill   • Phenylephrine-APAP-guaiFENesin (MUCINEX SINUS-MAX PO) Take  by mouth.     • HYDROcodone-acetaminophen (NORCO) 7.5-325 MG per tablet Take 1 Tablet by mouth every four hours as needed for up to 7 days. 40 tablets is a 7 day quantity 40 Tablet 0   • omeprazole (PRILOSEC) 20 MG delayed-release capsule Take 1 Capsule by mouth every day for 30 days. 30 Capsule 1     No current facility-administered medications for this visit.       Allergies   Allergen Reactions   • Zomig [Zolmitriptan] Shortness of Breath       ROS: As per HPI       Objective:     /70 (BP Location: Right arm, Patient Position: Sitting, BP Cuff Size: Adult)   Pulse 74   Temp 36.2 °C (97.1 °F) (Temporal)   Ht 1.613 m (5' 3.5\")   Wt 71.3 kg (157 lb 3.2 oz)   SpO2 98%  Body mass index is 27.41 kg/m².    Physical Exam:  Constitutional: Well-developed and well-nourished. Not diaphoretic. No distress. Lucid and fluent.  Patient, physician and staff all wearing masks.  Skin: Skin is warm and dry. No rash noted.  Head: Atraumatic without lesions.  Eyes: Conjunctivae and extraocular motions are normal. Pupils are equal, round, and reactive to light. No scleral icterus.   Ears:  External ears unremarkable.   Neck: Supple, trachea midline. No thyromegaly present. No cervical or supraclavicular " lymphadenopathy. No JVD or carotid bruits appreciated  Cardiovascular: Regular rate and rhythm.  Normal S1, S2 without murmur appreciated.  Chest: Effort normal. Clear to auscultation throughout. No adventitious sounds.   Abdomen: Soft, non tender, and without distention. Active bowel sounds in all four quadrants. No rebound, guarding, masses or hepatosplenomegaly.  Extremities: No cyanosis, clubbing, erythema, nor edema.   Neurological: Alert and oriented x 3.   Psychiatric:  Behavior, mood, and affect are appropriate.       Assessment and Plan:     41 y.o. female with the following issues:    1. Increased frequency of headaches  MR-BRAIN-W/O    TSH   2. History of intracranial hemorrhage  MR-BRAIN-W/O    CBC WITHOUT DIFFERENTIAL   3. Need for vaccination  INFLUENZA VACCINE QUAD INJ (PF)   4. Chronic post-traumatic headache, not intractable  HYDROcodone-acetaminophen (NORCO) 7.5-325 MG per tablet   5. Gastroesophageal reflux disease, unspecified whether esophagitis present  CBC WITHOUT DIFFERENTIAL   6. Cold intolerance  TSH   7. Glucose intolerance (impaired glucose tolerance)  HEMOGLOBIN A1C    Comp Metabolic Panel   8. Dyslipidemia  Comp Metabolic Panel    Lipid Profile         Followup: Return in about 6 months (around 4/7/2022), or if symptoms worsen or fail to improve.

## 2021-10-12 LAB
ALBUMIN SERPL-MCNC: 4.4 G/DL (ref 3.8–4.8)
ALBUMIN/GLOB SERPL: 2 {RATIO} (ref 1.2–2.2)
ALP SERPL-CCNC: 53 IU/L (ref 44–121)
ALT SERPL-CCNC: 15 IU/L (ref 0–32)
AST SERPL-CCNC: 14 IU/L (ref 0–40)
BILIRUB SERPL-MCNC: 0.2 MG/DL (ref 0–1.2)
BUN SERPL-MCNC: 12 MG/DL (ref 6–24)
BUN/CREAT SERPL: 18 (ref 9–23)
CALCIUM SERPL-MCNC: 9 MG/DL (ref 8.7–10.2)
CHLORIDE SERPL-SCNC: 101 MMOL/L (ref 96–106)
CHOLEST SERPL-MCNC: 255 MG/DL (ref 100–199)
CO2 SERPL-SCNC: 22 MMOL/L (ref 20–29)
CREAT SERPL-MCNC: 0.67 MG/DL (ref 0.57–1)
ERYTHROCYTE [DISTWIDTH] IN BLOOD BY AUTOMATED COUNT: 12.4 % (ref 11.7–15.4)
GLOBULIN SER CALC-MCNC: 2.2 G/DL (ref 1.5–4.5)
GLUCOSE SERPL-MCNC: 109 MG/DL (ref 65–99)
HBA1C MFR BLD: 6.5 % (ref 4.8–5.6)
HCT VFR BLD AUTO: 37.2 % (ref 34–46.6)
HDLC SERPL-MCNC: 66 MG/DL
HGB BLD-MCNC: 12.5 G/DL (ref 11.1–15.9)
LABORATORY COMMENT REPORT: ABNORMAL
LDLC SERPL CALC-MCNC: 167 MG/DL (ref 0–99)
MCH RBC QN AUTO: 29.2 PG (ref 26.6–33)
MCHC RBC AUTO-ENTMCNC: 33.6 G/DL (ref 31.5–35.7)
MCV RBC AUTO: 87 FL (ref 79–97)
NRBC BLD AUTO-RTO: NORMAL %
PLATELET # BLD AUTO: 327 X10E3/UL (ref 150–450)
POTASSIUM SERPL-SCNC: 4.3 MMOL/L (ref 3.5–5.2)
PROT SERPL-MCNC: 6.6 G/DL (ref 6–8.5)
RBC # BLD AUTO: 4.28 X10E6/UL (ref 3.77–5.28)
SODIUM SERPL-SCNC: 137 MMOL/L (ref 134–144)
TRIGL SERPL-MCNC: 123 MG/DL (ref 0–149)
TSH SERPL DL<=0.005 MIU/L-ACNC: 2.72 UIU/ML (ref 0.45–4.5)
VLDLC SERPL CALC-MCNC: 22 MG/DL (ref 5–40)
WBC # BLD AUTO: 7.3 X10E3/UL (ref 3.4–10.8)

## 2021-11-13 ENCOUNTER — HOSPITAL ENCOUNTER (OUTPATIENT)
Dept: RADIOLOGY | Facility: MEDICAL CENTER | Age: 41
End: 2021-11-13
Attending: FAMILY MEDICINE
Payer: COMMERCIAL

## 2021-11-13 DIAGNOSIS — R51.9 INCREASED FREQUENCY OF HEADACHES: ICD-10-CM

## 2021-11-13 DIAGNOSIS — Z86.79 HISTORY OF INTRACRANIAL HEMORRHAGE: ICD-10-CM

## 2021-11-13 PROCEDURE — 70551 MRI BRAIN STEM W/O DYE: CPT

## 2022-01-19 ENCOUNTER — OFFICE VISIT (OUTPATIENT)
Dept: MEDICAL GROUP | Facility: MEDICAL CENTER | Age: 42
End: 2022-01-19
Payer: COMMERCIAL

## 2022-01-19 VITALS
DIASTOLIC BLOOD PRESSURE: 66 MMHG | SYSTOLIC BLOOD PRESSURE: 110 MMHG | TEMPERATURE: 98.6 F | BODY MASS INDEX: 25.82 KG/M2 | RESPIRATION RATE: 14 BRPM | OXYGEN SATURATION: 99 % | HEIGHT: 64 IN | HEART RATE: 85 BPM | WEIGHT: 151.24 LBS

## 2022-01-19 DIAGNOSIS — R59.9 REACTIVE LYMPHOID HYPERPLASIA: ICD-10-CM

## 2022-01-19 DIAGNOSIS — J30.1 SEASONAL ALLERGIC RHINITIS DUE TO POLLEN: ICD-10-CM

## 2022-01-19 PROCEDURE — 99213 OFFICE O/P EST LOW 20 MIN: CPT | Performed by: FAMILY MEDICINE

## 2022-01-19 RX ORDER — TRIAMCINOLONE ACETONIDE 40 MG/ML
40 INJECTION, SUSPENSION INTRA-ARTICULAR; INTRAMUSCULAR ONCE
Status: COMPLETED | OUTPATIENT
Start: 2022-01-19 | End: 2022-01-19

## 2022-01-19 RX ADMIN — TRIAMCINOLONE ACETONIDE 40 MG: 40 INJECTION, SUSPENSION INTRA-ARTICULAR; INTRAMUSCULAR at 16:53

## 2022-01-19 ASSESSMENT — FIBROSIS 4 INDEX: FIB4 SCORE: 0.46

## 2022-01-20 NOTE — PROGRESS NOTES
Chief Complaint   Patient presents with   • Seasonal Allergies     much worse the last two months       Subjective:     HPI:   Estela Pena presents today with the followin. Seasonal allergic rhinitis due to pollen  Patient often has allergic symptoms in the spring.  Allegra-D tends to control this very well.  However for the last 2 months she has had more nasal congestion and frequent and continual drainage of clear fluid.  Denies any discolored phlegm.  Denies any fever or chills.  Denies body aches or severe fatigue.  This onset is a little early for her and a little more intense.  She did add Mucinex but that did not seem to make any difference in her symptoms.  Examination is actually most consistent with flare of her allergic rhinitis.  She is also having itching in her throat.  Denies hives or trouble breathing other than a feeling of congestion.  Discussed pros and cons of triamcinolone.  Injection administered today.  - Triamcinolone Acetonide    2. Reactive lymphoid hyperplasia  Just in the posterior pharynx.  Anterior cervical nodes minimally enlarged.  I believe this is consistent with her allergic symptoms.        Patient Active Problem List    Diagnosis Date Noted   • History of intracranial hemorrhage 10/07/2021   • Increased frequency of headaches 10/07/2021   • Seasonal allergic rhinitis due to pollen 2020   • Large cavernous hemangioma    • Chronic post-traumatic headache    • CVA (cerebrovascular accident due to intracerebral hemorrhage) ([facility]) 2009       Current medicines (including changes today)  Current Outpatient Medications   Medication Sig Dispense Refill   • Fexofenadine-Pseudoephedrine (ALLEGRA-D PO) Take  by mouth.     • Phenylephrine-APAP-guaiFENesin (MUCINEX SINUS-MAX PO) Take  by mouth.       Current Facility-Administered Medications   Medication Dose Route Frequency Provider Last Rate Last Admin   • triamcinolone acetonide (KENALOG-40) injection 40 mg  40 mg  "Intramuscular Once Joseph Canas M.D.           Allergies   Allergen Reactions   • Zomig [Zolmitriptan] Shortness of Breath       ROS: As per HPI       Objective:     /66 (BP Location: Right arm, Patient Position: Sitting, BP Cuff Size: Adult)   Pulse 85   Temp 37 °C (98.6 °F) (Temporal)   Resp 14   Ht 1.613 m (5' 3.5\")   Wt 68.6 kg (151 lb 3.8 oz)   SpO2 99%  Body mass index is 26.37 kg/m².    Physical Exam:  Constitutional: Well-developed and well-nourished. Not diaphoretic. No distress. Lucid and fluent.  Patient, spouse, physician and staff all wearing masks.  Patient mask removed briefly for examination.  Skin: Skin is warm and dry. No rash noted.  Head: Atraumatic without lesions.  Eyes: Conjunctivae and extraocular motions are normal. Pupils are equal, round, and reactive to light. No scleral icterus.   Ears:  External ears unremarkable.   Nose: Nares patent. Mucosa without edema or erythema. Clear discharge. No facial tenderness.  Mouth/Throat: Tongue normal. Oropharynx is clear and moist. Posterior pharynx without erythema or exudates. There is clear drainage and lymphoid hyperplasia.  Neck: Supple, trachea midline. No thyromegaly present. No cervical or supraclavicular lymphadenopathy. No JVD or carotid bruits appreciated  Cardiovascular: Regular rate and rhythm.  Normal S1, S2 without murmur appreciated.  Chest: Effort normal. Clear to auscultation throughout. No adventitious sounds.   Extremities: No cyanosis, clubbing, erythema, nor edema.   Neurological: Alert and oriented x 3.   Psychiatric:  Behavior, mood, and affect are appropriate.       Assessment and Plan:     41 y.o. female with the following issues:    1. Seasonal allergic rhinitis due to pollen  triamcinolone acetonide (KENALOG-40) injection 40 mg   2. Reactive lymphoid hyperplasia           Followup: Return if symptoms worsen or fail to improve.  "

## 2022-08-21 NOTE — TELEPHONE ENCOUNTER
1. Caller Name: Estela Pena                Call Back Number: [phone]  [facility] PCP or Specialty Provider: Yes Dr. Joseph Canas        2.  In the last two weeks, has the patient had any new or worsening symptoms (not explained by alternative diagnosis)? Yes, the patient reports the following COVID-19 consistent symptoms: congestion or runny nose. Stuffy nose, ears plugged x 2 weeks. Finished antibiotics 3 weeks ago for sore throat, now resolved.    3.  Does patient have any comoribidities? None     4.  Has the patient traveled in the last 14 days OR had any known contact with someone who is suspected or confirmed to have COVID-19?  No.    5. Disposition: Offered patient virtual visit to limit potential exposure to others; patient also given self care instructions    Note routed to [facility] Provider: EMILE only.    to set up PCP virtual visit on patient's day off Thursday, 6/18/2020.   
negative...

## 2023-01-09 ENCOUNTER — OFFICE VISIT (OUTPATIENT)
Dept: MEDICAL GROUP | Facility: MEDICAL CENTER | Age: 43
End: 2023-01-09
Payer: COMMERCIAL

## 2023-01-09 VITALS
SYSTOLIC BLOOD PRESSURE: 122 MMHG | OXYGEN SATURATION: 99 % | HEART RATE: 70 BPM | WEIGHT: 161.6 LBS | DIASTOLIC BLOOD PRESSURE: 84 MMHG | TEMPERATURE: 97.8 F | HEIGHT: 64 IN | BODY MASS INDEX: 27.59 KG/M2

## 2023-01-09 DIAGNOSIS — H10.13 ALLERGIC CONJUNCTIVITIS OF BOTH EYES: ICD-10-CM

## 2023-01-09 DIAGNOSIS — R06.7 SNEEZING: ICD-10-CM

## 2023-01-09 DIAGNOSIS — J30.9 ALLERGIC RHINITIS, UNSPECIFIED SEASONALITY, UNSPECIFIED TRIGGER: ICD-10-CM

## 2023-01-09 DIAGNOSIS — Z23 NEED FOR IMMUNIZATION AGAINST INFLUENZA: ICD-10-CM

## 2023-01-09 PROBLEM — R73.03 PREDIABETES: Status: ACTIVE | Noted: 2023-01-09

## 2023-01-09 PROCEDURE — 90471 IMMUNIZATION ADMIN: CPT | Performed by: FAMILY MEDICINE

## 2023-01-09 PROCEDURE — 99213 OFFICE O/P EST LOW 20 MIN: CPT | Mod: 25 | Performed by: FAMILY MEDICINE

## 2023-01-09 PROCEDURE — 90686 IIV4 VACC NO PRSV 0.5 ML IM: CPT | Performed by: FAMILY MEDICINE

## 2023-01-09 RX ORDER — AZELASTINE 1 MG/ML
2 SPRAY, METERED NASAL 2 TIMES DAILY
Qty: 30 ML | Refills: 4 | Status: SHIPPED | OUTPATIENT
Start: 2023-01-09 | End: 2023-01-25

## 2023-01-09 ASSESSMENT — PATIENT HEALTH QUESTIONNAIRE - PHQ9: CLINICAL INTERPRETATION OF PHQ2 SCORE: 0

## 2023-01-09 ASSESSMENT — FIBROSIS 4 INDEX: FIB4 SCORE: 0.48

## 2023-01-09 NOTE — PROGRESS NOTES
Chief Complaint   Patient presents with    Seasonal Allergies     Pt states her allergies have worsened recently. Pt states her throat is always itchy, her ears feel full, and she has severe sinus congestion. Allegra D has failed to improve sx. Sx present x2m.        Subjective:     HPI:   Estela Pena presents today with the followin. Allergic rhinitis, unspecified seasonality, unspecified trigger  Her allergies are worsening year over year.  Now seem year round.  Using flonase but not helping much and dripping.  She works in , this is a problem.  Using allegra D but not helping much.  Referral to allergy placed.  Trial of azelastine prescribed in interim.    2. Allergic conjunctivitis of both eyes  Has watering and itching of both eyes all the time.     3. Sneezing  She is sneezing 30-40 times per day.      4. Need for immunization against influenza  Flu vaccine administered today        Patient Active Problem List    Diagnosis Date Noted    Allergic rhinitis 2023    Allergic conjunctivitis of both eyes 2023    History of intracranial hemorrhage 10/07/2021    Increased frequency of headaches 10/07/2021    Seasonal allergic rhinitis due to pollen 2020    Large cavernous hemangioma     Chronic post-traumatic headache     CVA (cerebrovascular accident due to intracerebral hemorrhage) ([facility]) 2009       Current medicines (including changes today)  Current Outpatient Medications   Medication Sig Dispense Refill    azelastine (ASTELIN) 137 MCG/SPRAY nasal spray Administer 2 Sprays into affected nostril(S) 2 times a day. 30 mL 4    Fexofenadine-Pseudoephedrine (ALLEGRA-D PO) Take  by mouth.      Phenylephrine-APAP-guaiFENesin (MUCINEX SINUS-MAX PO) Take  by mouth.       No current facility-administered medications for this visit.       Allergies   Allergen Reactions    Zomig [Zolmitriptan] Shortness of Breath       ROS: As per HPI       Objective:     /84 (BP Location:  "Right arm, Patient Position: Sitting, BP Cuff Size: Small adult)   Pulse 70   Temp 36.6 °C (97.8 °F) (Temporal)   Ht 1.613 m (5' 3.5\")   Wt 73.3 kg (161 lb 9.6 oz)   SpO2 99%  Body mass index is 28.18 kg/m².    Physical Exam:  Constitutional: Well-developed and well-nourished. Not diaphoretic. No distress. Lucid and fluent.  Skin: Skin is warm and dry. No rash noted.  Patient, physician and staff all wearing masks.  Patient mask removed briefly for examination.  Head: Atraumatic without lesions.  Eyes: Conjunctivae and extraocular motions are normal. Pupils are equal, round, and reactive to light. No scleral icterus.   Ears:  External ears unremarkable. Tympanic membranes clear and intact.  Nose: Nares patent. Mucosa with moderate edema and erythema. Clear discharge. No facial tenderness.  Mouth/Throat: Tongue normal. Oropharynx is clear and moist. Posterior pharynx without erythema or exudates.  Ribbon of clear phlegm down back of throat with mild erythema.  Neck: Supple, trachea midline. No thyromegaly present. No cervical or supraclavicular lymphadenopathy. No JVD or carotid bruits appreciated  Cardiovascular: Regular rate and rhythm.  Normal S1, S2 without murmur appreciated.  Chest: Effort normal. Clear to auscultation throughout. No adventitious sounds.   Extremities: No cyanosis, clubbing, erythema, nor edema.   Neurological: Alert and oriented x 3.   Psychiatric:  Behavior, mood, and affect are appropriate.       Assessment and Plan:     42 y.o. female with the following issues:    1. Allergic rhinitis, unspecified seasonality, unspecified trigger  Referral to Allergy    azelastine (ASTELIN) 137 MCG/SPRAY nasal spray      2. Allergic conjunctivitis of both eyes  Referral to Allergy      3. Sneezing  Referral to Allergy      4. Need for immunization against influenza  INFLUENZA VACCINE QUAD INJ (PF)            Followup: Return in about 6 months (around 7/9/2023), or if symptoms worsen or fail to " improve.

## 2023-01-25 ENCOUNTER — OFFICE VISIT (OUTPATIENT)
Dept: MEDICAL GROUP | Facility: MEDICAL CENTER | Age: 43
End: 2023-01-25
Payer: COMMERCIAL

## 2023-01-25 VITALS
HEART RATE: 74 BPM | WEIGHT: 160.4 LBS | OXYGEN SATURATION: 100 % | BODY MASS INDEX: 27.39 KG/M2 | TEMPERATURE: 97.3 F | SYSTOLIC BLOOD PRESSURE: 108 MMHG | RESPIRATION RATE: 14 BRPM | DIASTOLIC BLOOD PRESSURE: 78 MMHG | HEIGHT: 64 IN

## 2023-01-25 DIAGNOSIS — Z00.00 LABORATORY EXAMINATION ORDERED AS PART OF A ROUTINE GENERAL MEDICAL EXAMINATION: ICD-10-CM

## 2023-01-25 DIAGNOSIS — Z00.00 ROUTINE GENERAL MEDICAL EXAMINATION AT A HEALTH CARE FACILITY: ICD-10-CM

## 2023-01-25 PROCEDURE — 99396 PREV VISIT EST AGE 40-64: CPT | Performed by: FAMILY MEDICINE

## 2023-01-25 ASSESSMENT — ENCOUNTER SYMPTOMS
TREMORS: 0
SEIZURES: 0
VOMITING: 0
MEMORY LOSS: 0
HEADACHES: 1
SHORTNESS OF BREATH: 0
LOSS OF CONSCIOUSNESS: 0
BLOOD IN STOOL: 0
WEAKNESS: 0
TINGLING: 0
NECK PAIN: 0
DIZZINESS: 0
MYALGIAS: 0
ABDOMINAL PAIN: 0
CHILLS: 0
WHEEZING: 0
SENSORY CHANGE: 0
NERVOUS/ANXIOUS: 0
BRUISES/BLEEDS EASILY: 0
WEIGHT LOSS: 0
SPEECH CHANGE: 0
FEVER: 0
DEPRESSION: 0
BACK PAIN: 0
SPUTUM PRODUCTION: 0
SORE THROAT: 0
NAUSEA: 0
INSOMNIA: 0
DIAPHORESIS: 0
ORTHOPNEA: 0
COUGH: 0
HALLUCINATIONS: 0
BLURRED VISION: 0
DIARRHEA: 0
PALPITATIONS: 0
FOCAL WEAKNESS: 0
DOUBLE VISION: 0
HEARTBURN: 1

## 2023-01-25 ASSESSMENT — FIBROSIS 4 INDEX: FIB4 SCORE: 0.48

## 2023-01-25 ASSESSMENT — LIFESTYLE VARIABLES: SUBSTANCE_ABUSE: 0

## 2023-01-26 NOTE — PROGRESS NOTES
Subjective     Estela Pena is a 42 y.o. female who presents with Annual Exam        She is here for her annual medical examination.    HPI     has a past medical history of Chronic post-traumatic headache, Large cavernous hemangioma, and Prediabetes (1/9/2023).   has a past surgical history that includes other abdominal surgery (01/1991); other (1/1991); mass excision neuro (10/22/2009); cholecystectomy (7/2000); craniectomy (10/22/2009); and hysterectomy, total abdominal (2010).  family history includes Diabetes in her mother; Heart Disease in her mother; Hypertension in her mother; Other in her father.   reports that she has never smoked. She has never used smokeless tobacco. She reports that she does not drink alcohol and does not use drugs.      Current Outpatient Medications:     Phenylephrine-APAP-guaiFENesin (MUCINEX SINUS-MAX PO), Take  by mouth., Disp: , Rfl:   is allergic to zomig [zolmitriptan].    Review of Systems   Constitutional:  Negative for chills, diaphoresis, fever, malaise/fatigue and weight loss.        Patient, physician and staff all wearing masks.   HENT:  Negative for congestion, hearing loss, sore throat and tinnitus.    Eyes:  Negative for blurred vision and double vision.        Occasionally blurry, may be due to her allergies   Respiratory:  Negative for cough, sputum production, shortness of breath and wheezing.    Cardiovascular:  Negative for chest pain, palpitations, orthopnea and leg swelling.   Gastrointestinal:  Positive for heartburn. Negative for abdominal pain, blood in stool, diarrhea, nausea and vomiting.        Does get occasional heartburn.  Set off by stress.   Genitourinary:  Negative for dysuria, frequency, hematuria and urgency.   Musculoskeletal:  Negative for back pain, joint pain, myalgias and neck pain.   Skin:  Negative for rash.   Neurological:  Positive for headaches. Negative for dizziness, tingling, tremors, sensory change, speech change, focal weakness,  "seizures, loss of consciousness and weakness.        Long standing stable episodic headache   Endo/Heme/Allergies:  Positive for environmental allergies. Does not bruise/bleed easily.   Psychiatric/Behavioral:  Negative for depression, hallucinations, memory loss, substance abuse and suicidal ideas. The patient is not nervous/anxious and does not have insomnia.             Objective     /78 (BP Location: Right arm, Patient Position: Sitting, BP Cuff Size: Small adult)   Pulse 74   Temp 36.3 °C (97.3 °F) (Temporal)   Resp 14   Ht 1.613 m (5' 3.5\")   Wt 72.8 kg (160 lb 6.4 oz)   LMP 07/07/2010   SpO2 100%   BMI 27.97 kg/m²      Physical Exam  Vitals reviewed.   Constitutional:       Appearance: She is well-developed.   HENT:      Head: Normocephalic and atraumatic.   Eyes:      General:         Left eye: No discharge.      Pupils: Pupils are equal, round, and reactive to light.   Neck:      Thyroid: No thyromegaly.      Vascular: No JVD.   Cardiovascular:      Rate and Rhythm: Normal rate and regular rhythm.      Heart sounds: Normal heart sounds. No murmur heard.  Pulmonary:      Effort: Pulmonary effort is normal. No respiratory distress.      Breath sounds: Normal breath sounds. No wheezing or rales.   Abdominal:      General: Bowel sounds are normal. There is no distension.      Palpations: Abdomen is soft. There is no mass.      Tenderness: There is no abdominal tenderness.   Musculoskeletal:         General: Normal range of motion.      Cervical back: Normal range of motion and neck supple.   Lymphadenopathy:      Cervical: No cervical adenopathy.   Skin:     General: Skin is warm and dry.      Findings: No erythema or rash.   Neurological:      Mental Status: She is alert and oriented to person, place, and time.      Coordination: Coordination normal.   Psychiatric:         Mood and Affect: Mood normal.         Behavior: Behavior normal.          Anticipatory guidance:  discussed visits every 6 " months.  Not yet due for colonoscopy.  Had a hysterectomy so does not do pap smears. Recommend mammograms every 2 years.  Always wears seat belt in vehicle.  Avoiding concentrated sweets.     Assessment & Plan        1. Routine general medical examination at a health care facility  She is generally well and medically stable    2. Laboratory examination ordered as part of a routine general medical examination  Routine orders discussed and placed.  - HEMOGLOBIN A1C; Future  - Comp Metabolic Panel; Future  - Lipid Profile; Future  - TSH; Future  - CBC WITHOUT DIFFERENTIAL; Future        Recheck 6 months, sooner as needed

## 2023-01-31 ENCOUNTER — HOSPITAL ENCOUNTER (OUTPATIENT)
Dept: LAB | Facility: MEDICAL CENTER | Age: 43
End: 2023-01-31
Attending: FAMILY MEDICINE
Payer: COMMERCIAL

## 2023-01-31 DIAGNOSIS — Z00.00 LABORATORY EXAMINATION ORDERED AS PART OF A ROUTINE GENERAL MEDICAL EXAMINATION: ICD-10-CM

## 2023-01-31 LAB
ALBUMIN SERPL BCP-MCNC: 4.6 G/DL (ref 3.2–4.9)
ALBUMIN/GLOB SERPL: 1.8 G/DL
ALP SERPL-CCNC: 58 U/L (ref 30–99)
ALT SERPL-CCNC: 21 U/L (ref 2–50)
ANION GAP SERPL CALC-SCNC: 11 MMOL/L (ref 7–16)
AST SERPL-CCNC: 15 U/L (ref 12–45)
BILIRUB SERPL-MCNC: 0.2 MG/DL (ref 0.1–1.5)
BUN SERPL-MCNC: 12 MG/DL (ref 8–22)
CALCIUM ALBUM COR SERPL-MCNC: 9.1 MG/DL (ref 8.5–10.5)
CALCIUM SERPL-MCNC: 9.6 MG/DL (ref 8.5–10.5)
CHLORIDE SERPL-SCNC: 100 MMOL/L (ref 96–112)
CHOLEST SERPL-MCNC: 312 MG/DL (ref 100–199)
CO2 SERPL-SCNC: 24 MMOL/L (ref 20–33)
CREAT SERPL-MCNC: 0.58 MG/DL (ref 0.5–1.4)
ERYTHROCYTE [DISTWIDTH] IN BLOOD BY AUTOMATED COUNT: 41.1 FL (ref 35.9–50)
EST. AVERAGE GLUCOSE BLD GHB EST-MCNC: 146 MG/DL
FASTING STATUS PATIENT QL REPORTED: NORMAL
GFR SERPLBLD CREATININE-BSD FMLA CKD-EPI: 114 ML/MIN/1.73 M 2
GLOBULIN SER CALC-MCNC: 2.6 G/DL (ref 1.9–3.5)
GLUCOSE SERPL-MCNC: 128 MG/DL (ref 65–99)
HBA1C MFR BLD: 6.7 % (ref 4–5.6)
HCT VFR BLD AUTO: 40.1 % (ref 37–47)
HDLC SERPL-MCNC: 84 MG/DL
HGB BLD-MCNC: 12.9 G/DL (ref 12–16)
LDLC SERPL CALC-MCNC: 201 MG/DL
MCH RBC QN AUTO: 29 PG (ref 27–33)
MCHC RBC AUTO-ENTMCNC: 32.2 G/DL (ref 33.6–35)
MCV RBC AUTO: 90.1 FL (ref 81.4–97.8)
PLATELET # BLD AUTO: 396 K/UL (ref 164–446)
PMV BLD AUTO: 9.8 FL (ref 9–12.9)
POTASSIUM SERPL-SCNC: 4.3 MMOL/L (ref 3.6–5.5)
PROT SERPL-MCNC: 7.2 G/DL (ref 6–8.2)
RBC # BLD AUTO: 4.45 M/UL (ref 4.2–5.4)
SODIUM SERPL-SCNC: 135 MMOL/L (ref 135–145)
TRIGL SERPL-MCNC: 136 MG/DL (ref 0–149)
TSH SERPL DL<=0.005 MIU/L-ACNC: 0.89 UIU/ML (ref 0.38–5.33)
WBC # BLD AUTO: 13.3 K/UL (ref 4.8–10.8)

## 2023-01-31 PROCEDURE — 80053 COMPREHEN METABOLIC PANEL: CPT

## 2023-01-31 PROCEDURE — 36415 COLL VENOUS BLD VENIPUNCTURE: CPT

## 2023-01-31 PROCEDURE — 84443 ASSAY THYROID STIM HORMONE: CPT

## 2023-01-31 PROCEDURE — 80061 LIPID PANEL: CPT

## 2023-01-31 PROCEDURE — 85027 COMPLETE CBC AUTOMATED: CPT

## 2023-01-31 PROCEDURE — 83036 HEMOGLOBIN GLYCOSYLATED A1C: CPT

## 2023-06-30 ENCOUNTER — APPOINTMENT (OUTPATIENT)
Dept: MEDICAL GROUP | Facility: MEDICAL CENTER | Age: 43
End: 2023-06-30
Payer: COMMERCIAL

## 2023-09-11 ENCOUNTER — APPOINTMENT (OUTPATIENT)
Dept: MEDICAL GROUP | Facility: MEDICAL CENTER | Age: 43
End: 2023-09-11
Payer: COMMERCIAL

## 2023-12-04 ENCOUNTER — OFFICE VISIT (OUTPATIENT)
Dept: MEDICAL GROUP | Facility: MEDICAL CENTER | Age: 43
End: 2023-12-04
Payer: COMMERCIAL

## 2023-12-04 VITALS
BODY MASS INDEX: 30.21 KG/M2 | HEIGHT: 61 IN | TEMPERATURE: 98 F | SYSTOLIC BLOOD PRESSURE: 110 MMHG | WEIGHT: 160 LBS | OXYGEN SATURATION: 99 % | DIASTOLIC BLOOD PRESSURE: 60 MMHG | RESPIRATION RATE: 18 BRPM | HEART RATE: 80 BPM

## 2023-12-04 DIAGNOSIS — G44.329 CHRONIC POST-TRAUMATIC HEADACHE, NOT INTRACTABLE: ICD-10-CM

## 2023-12-04 DIAGNOSIS — F51.01 PRIMARY INSOMNIA: ICD-10-CM

## 2023-12-04 DIAGNOSIS — E11.9 CONTROLLED TYPE 2 DIABETES MELLITUS WITHOUT COMPLICATION, WITHOUT LONG-TERM CURRENT USE OF INSULIN (HCC): ICD-10-CM

## 2023-12-04 PROCEDURE — 3078F DIAST BP <80 MM HG: CPT | Performed by: FAMILY MEDICINE

## 2023-12-04 PROCEDURE — 3074F SYST BP LT 130 MM HG: CPT | Performed by: FAMILY MEDICINE

## 2023-12-04 PROCEDURE — 99214 OFFICE O/P EST MOD 30 MIN: CPT | Performed by: FAMILY MEDICINE

## 2023-12-04 RX ORDER — HYDROCODONE BITARTRATE AND ACETAMINOPHEN 7.5; 325 MG/1; MG/1
1 TABLET ORAL EVERY 4 HOURS PRN
Qty: 40 TABLET | Refills: 0 | Status: SHIPPED | OUTPATIENT
Start: 2023-12-04 | End: 2023-12-11

## 2023-12-04 RX ORDER — ACETAMINOPHEN 325 MG/1
650 TABLET ORAL EVERY 4 HOURS PRN
COMMUNITY

## 2023-12-04 RX ORDER — TRAZODONE HYDROCHLORIDE 50 MG/1
50 TABLET ORAL NIGHTLY
Qty: 90 TABLET | Refills: 3 | Status: SHIPPED | OUTPATIENT
Start: 2023-12-04

## 2023-12-04 ASSESSMENT — FIBROSIS 4 INDEX: FIB4 SCORE: 0.36

## 2023-12-05 NOTE — PROGRESS NOTES
Chief Complaint   Patient presents with    Follow-Up    Headache    Sleep Problem       Subjective:     HPI:   Estela Pena presents today with the followin. Chronic post-traumatic headache, not intractable  Patient had a very large cavernous hemangioma removed in .  The presentation included headaches but postoperatively her headaches actually escalated.  They have typically been more controlled an intermittent.  However, with some recent stress she has had severe insomnia particularly difficulty getting to sleep and the headaches have escalated.  This has been a pattern for her in the past.  The hydrocodone worked reasonably well in the past as a rescue for her headaches.  Patient would like another prescription.  PDMP review shows no inconsistencies, patient has not had this medication filled for quite a long time.  No adverse events have been reported or observed.  7-day prescription for 40 tablets is discussed and written.  Max MME would be 45 which is within CDC guidelines.  She has a past opiate consent signed from 2020.  I forgot to get her to sign it again today.    2. Primary insomnia  Patient is having significant insomnia.  She has tried Tylenol PM and one of the other over-the-counter antihistamine based sleep aids and finds it is somewhat helpful but carries over into the next day and she has trouble functioning.  She has been trying melatonin with little to no success.  She was on Ambien in the past after her surgery but really disliked how she felt when she stopped it and does not want to run the risk of getting dependent.  Discussed trial of low-dose trazodone.      3. Controlled type 2 diabetes mellitus without complication, without long-term current use of insulin (HCC)  Patient's most recent A1c in January was at 6.7% consistent with well-controlled diabetes.  She uses diet and exercise.  I would like her to get this done again and have placed lab orders.  These are  "discussed.        Patient Active Problem List    Diagnosis Date Noted    Primary insomnia 12/04/2023    Allergic rhinitis 01/09/2023    Allergic conjunctivitis of both eyes 01/09/2023    Prediabetes 01/09/2023    History of intracranial hemorrhage 10/07/2021    Increased frequency of headaches 10/07/2021    Seasonal allergic rhinitis due to pollen 06/18/2020    Large cavernous hemangioma     Chronic post-traumatic headache     CVA (cerebrovascular accident due to intracerebral hemorrhage) ([facility]) 07/17/2009       Current medicines (including changes today)  Current Outpatient Medications   Medication Sig Dispense Refill    acetaminophen (TYLENOL) 325 MG Tab Take 650 mg by mouth every four hours as needed.      HYDROcodone-acetaminophen (NORCO) 7.5-325 MG tab Take 1 Tablet by mouth every four hours as needed for Moderate Pain or Severe Pain (headache) for up to 7 days. 40 tablets is a 30 day quantity 40 Tablet 0    traZODone (DESYREL) 50 MG Tab Take 1 Tablet by mouth every evening. 90 Tablet 3     No current facility-administered medications for this visit.       Allergies   Allergen Reactions    Zomig [Zolmitriptan] Shortness of Breath       ROS: As per HPI       Objective:     /60   Pulse 80   Temp 36.7 °C (98 °F)   Resp 18   Ht 1.537 m (5' 0.5\")   Wt 72.6 kg (160 lb)   SpO2 99%  Body mass index is 30.73 kg/m².    Physical Exam:  Constitutional: Well-developed and well-nourished. Not diaphoretic. No distress. Lucid and fluent.  Skin: Skin is warm and dry. No rash noted.  Head: Atraumatic without lesions.  Eyes: Conjunctivae and extraocular motions are normal. Pupils are equal, round, and reactive to light. No scleral icterus.   Ears:  External ears unremarkable.   Neck: Supple, trachea midline. No thyromegaly present.  No nodularity appreciated.  No cervical or supraclavicular lymphadenopathy. No JVD or carotid bruits appreciated  Cardiovascular: Regular rate and rhythm.  Normal S1, S2 without murmur " appreciated.  Chest: Effort normal. Clear to auscultation throughout. No adventitious sounds.   Extremities: No cyanosis, clubbing, erythema, nor edema.   Neurological: Alert and oriented x 3.  No tremor appreciated.  Movements are symmetric.  No facial asymmetry appreciated  Psychiatric:  Behavior, mood, and affect are appropriate.       Assessment and Plan:     43 y.o. female with the following issues:    1. Chronic post-traumatic headache, not intractable  HYDROcodone-acetaminophen (NORCO) 7.5-325 MG tab      2. Primary insomnia  traZODone (DESYREL) 50 MG Tab      3. Controlled type 2 diabetes mellitus without complication, without long-term current use of insulin (HCC)  Comp Metabolic Panel    CBC WITHOUT DIFFERENTIAL    TSH    Lipid Profile    MICROALBUMIN CREAT RATIO URINE    HEMOGLOBIN A1C        Consequences of Opiate therapy:  (5 A's)  Analgesia:  improved significantly in the past  Activity:  improved  Adverse Events: None reported or observed  Aberrant Behaviors: None reported or observed  Affect/Mood: good grooming, full facial expressions, normal speech pattern and content, normal thought patterns, normal perception, good insight, normal reasoning  Last CMP:   January, order placed  Appropriate Imaging done:   Yes and surgery.  Brain MRI done November 2021 showed no recurrence of the hemangioma and no new mass.      Followup: Return in about 6 months (around 6/4/2024), or if symptoms worsen or fail to improve.

## 2024-04-19 ENCOUNTER — HOSPITAL ENCOUNTER (OUTPATIENT)
Dept: LAB | Facility: MEDICAL CENTER | Age: 44
End: 2024-04-19
Attending: FAMILY MEDICINE
Payer: COMMERCIAL

## 2024-04-19 DIAGNOSIS — E11.9 CONTROLLED TYPE 2 DIABETES MELLITUS WITHOUT COMPLICATION, WITHOUT LONG-TERM CURRENT USE OF INSULIN (HCC): ICD-10-CM

## 2024-04-19 LAB
ALBUMIN SERPL BCP-MCNC: 4.7 G/DL (ref 3.2–4.9)
ALBUMIN/GLOB SERPL: 1.9 G/DL
ALP SERPL-CCNC: 52 U/L (ref 30–99)
ALT SERPL-CCNC: 17 U/L (ref 2–50)
ANION GAP SERPL CALC-SCNC: 11 MMOL/L (ref 7–16)
AST SERPL-CCNC: 19 U/L (ref 12–45)
BILIRUB SERPL-MCNC: 0.4 MG/DL (ref 0.1–1.5)
BUN SERPL-MCNC: 9 MG/DL (ref 8–22)
CALCIUM ALBUM COR SERPL-MCNC: 9.1 MG/DL (ref 8.5–10.5)
CALCIUM SERPL-MCNC: 9.7 MG/DL (ref 8.5–10.5)
CHLORIDE SERPL-SCNC: 104 MMOL/L (ref 96–112)
CHOLEST SERPL-MCNC: 276 MG/DL (ref 100–199)
CO2 SERPL-SCNC: 26 MMOL/L (ref 20–33)
CREAT SERPL-MCNC: 0.72 MG/DL (ref 0.5–1.4)
CREAT UR-MCNC: 91.73 MG/DL
ERYTHROCYTE [DISTWIDTH] IN BLOOD BY AUTOMATED COUNT: 39.5 FL (ref 35.9–50)
EST. AVERAGE GLUCOSE BLD GHB EST-MCNC: 134 MG/DL
FASTING STATUS PATIENT QL REPORTED: NORMAL
GFR SERPLBLD CREATININE-BSD FMLA CKD-EPI: 105 ML/MIN/1.73 M 2
GLOBULIN SER CALC-MCNC: 2.5 G/DL (ref 1.9–3.5)
GLUCOSE SERPL-MCNC: 100 MG/DL (ref 65–99)
HBA1C MFR BLD: 6.3 % (ref 4–5.6)
HCT VFR BLD AUTO: 42.3 % (ref 37–47)
HDLC SERPL-MCNC: 72 MG/DL
HGB BLD-MCNC: 13.9 G/DL (ref 12–16)
LDLC SERPL CALC-MCNC: 186 MG/DL
MCH RBC QN AUTO: 29.3 PG (ref 27–33)
MCHC RBC AUTO-ENTMCNC: 32.9 G/DL (ref 32.2–35.5)
MCV RBC AUTO: 89.1 FL (ref 81.4–97.8)
MICROALBUMIN UR-MCNC: <1.2 MG/DL
MICROALBUMIN/CREAT UR: NORMAL MG/G (ref 0–30)
PLATELET # BLD AUTO: 351 K/UL (ref 164–446)
PMV BLD AUTO: 10.2 FL (ref 9–12.9)
POTASSIUM SERPL-SCNC: 4.8 MMOL/L (ref 3.6–5.5)
PROT SERPL-MCNC: 7.2 G/DL (ref 6–8.2)
RBC # BLD AUTO: 4.75 M/UL (ref 4.2–5.4)
SODIUM SERPL-SCNC: 141 MMOL/L (ref 135–145)
TRIGL SERPL-MCNC: 89 MG/DL (ref 0–149)
TSH SERPL DL<=0.005 MIU/L-ACNC: 1.01 UIU/ML (ref 0.38–5.33)
WBC # BLD AUTO: 4.6 K/UL (ref 4.8–10.8)

## 2024-04-19 PROCEDURE — 84443 ASSAY THYROID STIM HORMONE: CPT

## 2024-04-19 PROCEDURE — 85027 COMPLETE CBC AUTOMATED: CPT

## 2024-04-19 PROCEDURE — 36415 COLL VENOUS BLD VENIPUNCTURE: CPT

## 2024-04-19 PROCEDURE — 82043 UR ALBUMIN QUANTITATIVE: CPT

## 2024-04-19 PROCEDURE — 83036 HEMOGLOBIN GLYCOSYLATED A1C: CPT

## 2024-04-19 PROCEDURE — 80061 LIPID PANEL: CPT

## 2024-04-19 PROCEDURE — 82570 ASSAY OF URINE CREATININE: CPT

## 2024-04-19 PROCEDURE — 80053 COMPREHEN METABOLIC PANEL: CPT
